# Patient Record
Sex: FEMALE | Race: OTHER
[De-identification: names, ages, dates, MRNs, and addresses within clinical notes are randomized per-mention and may not be internally consistent; named-entity substitution may affect disease eponyms.]

---

## 2020-10-05 ENCOUNTER — TRANSCRIPTION ENCOUNTER (OUTPATIENT)
Age: 30
End: 2020-10-05

## 2020-10-14 ENCOUNTER — TRANSCRIPTION ENCOUNTER (OUTPATIENT)
Age: 30
End: 2020-10-14

## 2020-10-15 ENCOUNTER — EMERGENCY (EMERGENCY)
Facility: HOSPITAL | Age: 30
LOS: 0 days | Discharge: HOME | End: 2020-10-15
Attending: EMERGENCY MEDICINE | Admitting: EMERGENCY MEDICINE
Payer: COMMERCIAL

## 2020-10-15 VITALS
OXYGEN SATURATION: 100 % | TEMPERATURE: 98 F | HEIGHT: 63 IN | SYSTOLIC BLOOD PRESSURE: 114 MMHG | DIASTOLIC BLOOD PRESSURE: 66 MMHG | WEIGHT: 130.07 LBS | HEART RATE: 96 BPM | RESPIRATION RATE: 18 BRPM

## 2020-10-15 DIAGNOSIS — I80.9 PHLEBITIS AND THROMBOPHLEBITIS OF UNSPECIFIED SITE: ICD-10-CM

## 2020-10-15 DIAGNOSIS — M25.539 PAIN IN UNSPECIFIED WRIST: ICD-10-CM

## 2020-10-15 PROCEDURE — 99282 EMERGENCY DEPT VISIT SF MDM: CPT

## 2020-10-15 NOTE — ED PROVIDER NOTE - OBJECTIVE STATEMENT
30 year old female with pmhs of miscarriage on Oct 5, with IV placement to right hand, present with pain and swelling to right hand over last 2 weeks. pt went to  had normal xray and was sent for evaluation. no trauma, redness, fever or chills.

## 2020-10-15 NOTE — ED PROVIDER NOTE - PHYSICAL EXAMINATION
GEN: Patient in no acute distress  MS: Normal ROM. pulses 2 +.  SKIN: tenderness along dorsum of right hand along vein, ripe like appearance, no redness or warmth. Warm, dry, no acute rashes. Good turgor  NEURO: Strength 5/5 with no sensory deficits to right hand

## 2020-10-15 NOTE — ED PROVIDER NOTE - NS ED ROS FT
Review of Systems:  	•	CONSTITUTIONAL - no fever, no diaphoresis, no chills  	•	SKIN - no rash  	•	HEMATOLOGIC - no bleeding, no bruising  	•	MUSCULOSKELETAL - + right hand pain and swelling  	•	NEUROLOGIC - no weakness, no paresthesias

## 2020-10-15 NOTE — ED PROVIDER NOTE - CARE PROVIDER_API CALL
Nestor Varma  Vascular Surgery  [address] [place] [address]  Phone: [phone]  Fax: [phone]  Follow Up Time:

## 2020-10-15 NOTE — ED PROVIDER NOTE - PATIENT PORTAL LINK FT
You can access the FollowMyHealth Patient Portal offered by [facility] by registering at the following website: [email or web address]. By joining [facility]’s FollowMyHealth portal, you will also be able to view your health information using other applications (apps) compatible with our system.

## 2020-10-15 NOTE — ED PROVIDER NOTE - NSFOLLOWUPINSTRUCTIONS_ED_ALL_ED_FT
Superficial Thrombophlebitis    WHAT YOU NEED TO KNOW:    Superficial thrombophlebitis (STP) is inflammation of a vein just under your skin (superficial vein). The inflammation causes a blood clot to form in your vein. STP most often happens in your leg but may also happen in your arm.     DISCHARGE INSTRUCTIONS:    Call your local emergency number (911 in the [place]) if:   •You feel lightheaded, short of breath, and have chest pain.      •You cough up blood.      Call your doctor or hematologist if:   •Your arm or leg feels warm, tender, and painful. It may look swollen and red.      •You have questions or concerns about your condition or care.      Medicines: You may need any of the following:   •Antibiotics may be given to treat a bacterial infection.       •NSAIDs, such as ibuprofen, help decrease swelling, pain, and fever. NSAIDs can cause stomach bleeding or kidney problems in certain people. If you take blood thinner medicine, always ask your healthcare provider if NSAIDs are safe for you. Always read the medicine label and follow directions.      •Blood thinners help prevent blood clots. Clots can cause strokes, heart attacks, and death. The following are general safety guidelines to follow while you are taking a blood thinner:?Watch for bleeding and bruising while you take blood thinners. Watch for bleeding from your gums or nose. Watch for blood in your urine and bowel movements. Use a soft washcloth on your skin, and a soft toothbrush to brush your teeth. This can keep your skin and gums from bleeding. If you shave, use an electric shaver. Do not play contact sports.       ?Tell your dentist and other healthcare providers that you take a blood thinner. Wear a bracelet or necklace that says you take this medicine.       ?Do not start or stop any other medicines unless your healthcare provider tells you to. Many medicines cannot be used with blood thinners.      ?Take your blood thinner exactly as prescribed by your healthcare provider. Do not skip does or take less than prescribed. Tell your provider right away if you forget to take your blood thinner, or if you take too much.      ?Warfarin is a blood thinner that you may need to take. The following are things you should be aware of if you take warfarin: ?Foods and medicines can affect the amount of warfarin in your blood. Do not make major changes to your diet while you take warfarin. Warfarin works best when you eat about the same amount of vitamin K every day. Vitamin K is found in green leafy vegetables and certain other foods. Ask for more information about what to eat when you are taking warfarin.      ?You will need to see your healthcare provider for follow-up visits when you are on warfarin. You will need regular blood tests. These tests are used to decide how much medicine you need.         •Antiplatelets, such as aspirin, help prevent blood clots. Take your antiplatelet medicine exactly as directed. These medicines make it more likely for you to bleed or bruise. If you are told to take aspirin, do not take acetaminophen or ibuprofen instead.       •Take your medicine as directed. Contact your healthcare provider if you think your medicine is not helping or if you have side effects. Tell him of her if you are allergic to any medicine. Keep a list of the medicines, vitamins, and herbs you take. Include the amounts, and when and why you take them. Bring the list or the pill bottles to follow-up visits. Carry your medicine list with you in case of an emergency.      Manage STP:   •Apply a warm compress to your arm or leg. This will help decrease swelling and pain. Wet a washcloth in warm water. Do not use hot water. Apply the warm compress for 10 minutes. Repeat this 4 times each day.       •Wear pressure stockings as directed. Pressure stockings improve blood flow and help prevent clots in your legs. Wear the stockings during the day. Do not wear them when you sleep.   Pressure Stockings            •Elevate your leg or arm above the level of your heart as often as you can. This will help decrease swelling and pain. Prop your leg or arm on pillows or blankets to keep it elevated comfortably.   Elevate Leg           Prevent STP:   •Maintain a healthy weight. This will help decrease your risk for another blood clot. Ask your healthcare provider how much you should weigh. Ask him or her to help you create a weight loss plan if you are overweight.      •Do not smoke. Nicotine and other chemicals in cigarettes and cigars can damage blood vessels and increase your risk for blood clots. Ask your healthcare provider for information if you currently smoke and need help to quit. E-cigarettes or smokeless tobacco still contain nicotine. Talk to your healthcare provider before you use these products.      •Change your body position or move around often. Move and stretch in your seat several times each hour if you travel by car or work at a desk. In an airplane, get up and walk every hour. Move your legs by tightening and releasing your leg muscles while sitting. You can move your legs while sitting by raising and lowering your heels. Keep your toes on the floor while you do this. You can also raise and lower your toes while keeping your heels on the floor.  DVT Prevention Heel Raise       DVT Prevention Toe Raise           •Exercise regularly to help increase your blood flow. Walking is a good low-impact exercise. Talk to your healthcare provider about the best exercise plan for you.   Walking for Exercise           •Do not inject illegal drugs. Talk to your healthcare provider if you use IV drugs and need help to quit.      Follow up with your doctor or hematologist as directed: You may need to come in regularly for scans to check for blood clots. Your blood may checked to see how long it takes to clot. Your doctor or specialist will tell you if you need to have this test and how often to have it. Write down your questions so you remember to ask them during your visits.

## 2020-10-15 NOTE — ED ADULT TRIAGE NOTE - CHIEF COMPLAINT QUOTE
Pt seen in urgent care yesterday for right wrist pain, x-rays negative at urgent care. Pt instructed to come to ED for further evaluation. Pt denies injury, denies fall to wrist.

## 2020-10-15 NOTE — ED PROVIDER NOTE - ATTENDING CONTRIBUTION TO CARE
pt sp IV placement oct 5th in hand. co pain at site. went to urgent care, had neg xray, was sent for further eval. pt taking apap.   pt in nad, hand nml appearing. tender over dorsum of hand. no swelling, crepitus, erythema, wrmth. joints nml, all FROM. nml cap refill. m/u/r nerves nml. pulses nml. no FB palpated. no induration or fluctuance.      likely superfic phlebitis. nsaids, outtp f/u .

## 2020-10-16 PROBLEM — Z00.00 ENCOUNTER FOR PREVENTIVE HEALTH EXAMINATION: Status: ACTIVE | Noted: 2020-10-16

## 2020-11-03 ENCOUNTER — APPOINTMENT (OUTPATIENT)
Age: 30
End: 2020-11-03
Payer: COMMERCIAL

## 2020-11-03 VITALS — BODY MASS INDEX: 23.04 KG/M2 | WEIGHT: 130 LBS | HEIGHT: 63 IN | TEMPERATURE: 98 F

## 2020-11-03 VITALS — SYSTOLIC BLOOD PRESSURE: 125 MMHG | DIASTOLIC BLOOD PRESSURE: 83 MMHG

## 2020-11-03 PROCEDURE — 99213 OFFICE O/P EST LOW 20 MIN: CPT

## 2020-11-03 PROCEDURE — 99072 ADDL SUPL MATRL&STAF TM PHE: CPT

## 2020-11-03 PROCEDURE — 93971 EXTREMITY STUDY: CPT

## 2020-11-03 NOTE — HISTORY OF PRESENT ILLNESS
[FreeTextEntry1] : The patient is a 30-year-old female who recently had a miscarriage was hospitalized. Should intravenous placed in her right hand. The patient states after she was discharged she began to develop right hand and wrist pain and swelling. [facility] diagnosed with phlebitis however never had a venous duplex exam\par \par \par Due to COVID-19, pre-visit patient instructions were explained to the patient and their symptoms were checked upon arrival. Masks were used by the healthcare provider and staff and the examination room was cleaned after the patient visit was concluded.\par

## 2020-11-03 NOTE — ASSESSMENT
[FreeTextEntry1] : The patient is a 30-year-old female status post a right hand intravenous she developed a right hand phlebitis. I performed a venous duplex that showed superficial thrombophlebitis in the basilic vein at the wrist and anterior side of the hand. All of the major vein splenic hospital. I recommend she continue with warm compresses and nonsteroidal anti-inflammatories as tolerated. No vascular surgery intervention at this time

## 2020-11-06 ENCOUNTER — APPOINTMENT (OUTPATIENT)
Dept: VASCULAR SURGERY | Facility: CLINIC | Age: 30
End: 2020-11-06
Payer: COMMERCIAL

## 2020-11-06 DIAGNOSIS — M79.602 PAIN IN LEFT ARM: ICD-10-CM

## 2020-11-06 DIAGNOSIS — I80.8 PHLEBITIS AND THROMBOPHLEBITIS OF OTHER SITES: ICD-10-CM

## 2020-11-06 PROCEDURE — 93971 EXTREMITY STUDY: CPT

## 2020-11-06 PROCEDURE — 99072 ADDL SUPL MATRL&STAF TM PHE: CPT

## 2020-11-06 PROCEDURE — 99213 OFFICE O/P EST LOW 20 MIN: CPT

## 2020-11-06 RX ORDER — MULTIVIT-MIN/IRON/FOLIC ACID/K 18-600-40
CAPSULE ORAL
Refills: 0 | Status: ACTIVE | COMMUNITY

## 2020-11-06 RX ORDER — IRON/IRON ASP GLY/FA/MV-MIN 38 125-25-1MG
TABLET ORAL
Refills: 0 | Status: ACTIVE | COMMUNITY

## 2020-11-06 NOTE — ASSESSMENT
[FreeTextEntry1] : 29 y/o female who was hospitalized for miscarriage recently, seen for phlebitis in the right upper extremity few days ago, presents with left arm pain since yesterday, had blood drawn from left UE during hospitalization.\par \par On venous duplex today there was finding of acute thrombus in the left ulnar vein. She was instructed to take Aleve for 5 days and warm compresses to the area. She is being sent for thrombophilia workup by her GYN.

## 2020-11-06 NOTE — DATA REVIEWED
[FreeTextEntry1] : I performed a venous duplex which was medically necessary to evaluate for DVT. It shwoed acute thrombus in the left ulnar vein.\par

## 2020-11-06 NOTE — HISTORY OF PRESENT ILLNESS
[FreeTextEntry1] : 31 y/o female who was hospitalized for miscarriage recently, seen for phlebitis in the right upper extremity few days ago, presents with left arm pain since yesterday, had blood drawn from left UE during hospitalization.

## 2021-06-14 ENCOUNTER — EMERGENCY (EMERGENCY)
Facility: HOSPITAL | Age: 31
LOS: 0 days | Discharge: HOME | End: 2021-06-15
Attending: EMERGENCY MEDICINE | Admitting: EMERGENCY MEDICINE
Payer: COMMERCIAL

## 2021-06-14 VITALS
RESPIRATION RATE: 18 BRPM | OXYGEN SATURATION: 100 % | HEIGHT: 63 IN | TEMPERATURE: 100 F | WEIGHT: 141.1 LBS | SYSTOLIC BLOOD PRESSURE: 111 MMHG | HEART RATE: 90 BPM | DIASTOLIC BLOOD PRESSURE: 87 MMHG

## 2021-06-14 DIAGNOSIS — O99.891 OTHER SPECIFIED DISEASES AND CONDITIONS COMPLICATING PREGNANCY: ICD-10-CM

## 2021-06-14 DIAGNOSIS — R10.84 GENERALIZED ABDOMINAL PAIN: ICD-10-CM

## 2021-06-14 DIAGNOSIS — Z3A.01 LESS THAN 8 WEEKS GESTATION OF PREGNANCY: ICD-10-CM

## 2021-06-14 DIAGNOSIS — Z98.890 OTHER SPECIFIED POSTPROCEDURAL STATES: Chronic | ICD-10-CM

## 2021-06-14 DIAGNOSIS — O21.8 OTHER VOMITING COMPLICATING PREGNANCY: ICD-10-CM

## 2021-06-14 DIAGNOSIS — O20.0 THREATENED ABORTION: ICD-10-CM

## 2021-06-14 PROCEDURE — 99284 EMERGENCY DEPT VISIT MOD MDM: CPT

## 2021-06-14 RX ORDER — ACETAMINOPHEN 500 MG
975 TABLET ORAL ONCE
Refills: 0 | Status: COMPLETED | OUTPATIENT
Start: 2021-06-14 | End: 2021-06-14

## 2021-06-14 RX ORDER — ONDANSETRON 8 MG/1
4 TABLET, FILM COATED ORAL ONCE
Refills: 0 | Status: COMPLETED | OUTPATIENT
Start: 2021-06-14 | End: 2021-06-14

## 2021-06-14 NOTE — ED PROVIDER NOTE - PROGRESS NOTE DETAILS
TC: 30 yo F TC: 32 yo F with no PMHx,  ~7 wks pregnant who presents with vaginal spotting and abd pain x2 hrs. Vitals wnl in ED. No peritonitic signs in ED. Pelvic exam shows some blood in vaginal vault with open os. Ordered labs, urine, US. Given tylenol, zofran. Will reassess. TC: Reassessed pt, reports abd pain improved and vaginal spotting stopped however still feels bloated. Beta quant 7000s. Pt taken to US. TC: Reassessed pt, reports abd pain improved and vaginal spotting stopped however still feels bloated. Beta quant ~8000. Pt taken to US. TC: Ua negative. US shows IUP 6w1d but no fetal HR detected which may be due to early gestation. Advised to f/u with OBGYN in 2 days for repeat beta quant and US. Strict ED return precautions given. Pt verbalized understanding and was agreeable with plan.

## 2021-06-14 NOTE — ED PROVIDER NOTE - ATTENDING CONTRIBUTION TO CARE
I personally evaluated the patient. I reviewed the Resident’s or Physician Assistant’s note (as assigned above), and agree with the findings and plan except as documented in my note.    30 yo F with no PMHx,  ~7 wks pregnant who presents with  abd cramping associated with nausea, vomiting, vaginal spotting, abd bloating which occurred 2 hrs prior to arrival. No fever, chills, cp, sob, dysuria, vaginal discharge, hematochezia, melena. Pt had IUP confirmed on US last wk.     CONSTITUTIONAL: Well-developed; well-nourished; in no acute distress. Sitting up and providing appropriate history and physical examination  SKIN: skin exam is warm and dry, no acute rash.  HEAD: Normocephalic; atraumatic.  EYES: PERRL, 3 mm bilateral, no nystagmus, EOM intact; conjunctiva and sclera clear.  ENT: No nasal discharge; airway clear.  NECK: Supple; non tender.+ full passive ROM in all directions. No JVD  CARD: S1, S2 normal; no murmurs, gallops, or rubs. Regular rate and rhythm. + Symmetric Strong Pulses  RESP: No wheezes, rales or rhonchi. Good air movement bilaterally  ABD: soft; non-distended; non-tender. No Rebound, No Gaurding, No signs of peritnitis, No CVA tenderness      Plan- US, labs, reassess

## 2021-06-14 NOTE — ED PROVIDER NOTE - CARE PROVIDER_API CALL
Joseph Mendoza  OBSTETRICS AND GYNECOLOGY  [address] [place] [address]  Phone: [phone]  Fax: [phone]  Follow Up Time: 1-3 Days

## 2021-06-14 NOTE — ED ADULT TRIAGE NOTE - CHIEF COMPLAINT QUOTE
Pt reports she is about 7 weeks pregnant. She states she went to have a bowel movement and starting spotting. Pt reports lower abominal pain and cramping. Has hx of 1 miscarriage in October 2020.

## 2021-06-14 NOTE — ED PROVIDER NOTE - PHYSICAL EXAMINATION
CONSTITUTIONAL: well developed, nontoxic appearing, in no acute distress, speaking in full sentences  SKIN: warm, dry, no rash, cap refill < 2 seconds  HEENT: normocephalic, atraumatic, no conjunctival erythema, moist mucous membranes, patent airway  NECK: supple  CV:  regular rate, regular rhythm, 2+ radial pulses bilaterally  RESP: no wheezes, no rales, no rhonchi, normal work of breathing  ABD: soft, LLQ tenderness, no RLQ tenderness, nondistended, no rebound, no guarding  BACK: no CVA tenderness  : normal external genitalia without lesions or rash, small amount of brown blood in vaginal vault, os open, no CMT, no fundal/adnexal tenderness, no palpable mass, female chaperone DAYA Jacobson present  MSK: normal ROM, no cyanosis, no edema  NEURO: alert, oriented, grossly unremarkable  PSYCH: cooperative, appropriate

## 2021-06-14 NOTE — ED PROVIDER NOTE - PATIENT PORTAL LINK FT
You can access the FollowMyHealth Patient Portal offered by [facility] by registering at the following website: [email or web address]. By joining [facility]’s FollowMyHealth portal, you will also be able to view your health information using other applications (apps) compatible with our system.

## 2021-06-14 NOTE — ED PROVIDER NOTE - NS ED ROS FT
GEN:  no fever, no chills, no generalized weakness  NEURO:  no headache, no dizziness  ENT: no runny nose  CV:  no chest pain  RESP:  no sob, no cough  GI:  + nausea, + vomiting, + abdominal pain, no diarrhea  :  no dysuria  MSK:  no edema  SKIN:  no rash  HEME: no hematochezia, no melena

## 2021-06-14 NOTE — ED PROVIDER NOTE - OBJECTIVE STATEMENT
32 yo F with no PMHx,  ~7 wks pregnant who presents with acute onset of diffuse, mod/severe abd cramping associated with nausea, vomiting, vaginal spotting, abd bloating which occurred 2 hrs prior to arrival after having a BM. No alleviating/aggravating factors. No fever, chills, cp, sob, dysuria, vaginal discharge, hematochezia, melena. Pt had IUP confirmed on US last wk. Hx of D&C x1 and miscarriage in 10/2020.    OBGYN: Dr. Wilson

## 2021-06-14 NOTE — ED PROVIDER NOTE - NSFOLLOWUPINSTRUCTIONS_ED_ALL_ED_FT
Heartburn During Pregnancy    Heartburn is a type of pain or discomfort that can happen in the throat or chest. It is often described as a burning sensation. Heartburn is common during pregnancy because:  •Progesterone, a hormone that is released during pregnancy, may relax the valve that separates the esophagus from the stomach (lower esophageal sphincter, or LES). This allows stomach acid to move up into the esophagus, causing heartburn.    •The uterus gets larger and pushes up on the stomach, which pushes more acid into the esophagus. This is especially true in the later stages of pregnancy.    Heartburn usually goes away or gets better after giving birth.    What are the causes?  This condition is caused by stomach acid backing up into the esophagus (reflux). Reflux can be triggered by:  •Changing hormone levels.  •Large meals.  •Certain foods and beverages, such as coffee, chocolate, onions, and peppermint.  •Exercise.  •Increased stomach acid production.    What increases the risk?  You are more likely to develop this condition if:  •You had heartburn prior to becoming pregnant.  •You have been pregnant more than once before.  •You are overweight or obese.    The likelihood that you will get heartburn also increases as you get further along in your pregnancy, especially during the last trimester.    What are the signs or symptoms?  Symptoms of this condition include:  •Burning pain in the chest or lower throat.  •A bitter taste in the mouth.  •Coughing.  •Problems swallowing.  •Vomiting.  •A hoarse voice.  •Asthma.    Symptoms may get worse when you lie down or bend over. Symptoms are often worse at night.    How is this diagnosed?  This condition is diagnosed based on:  •Your medical history.  •Your symptoms.  •Blood tests to check for a certain type of bacteria that is associated with heartburn.  •Whether taking heartburn medicine relieves your symptoms.  •An examination of the stomach and esophagus using a tube that has a light and camera (endoscopy).    How is this treated?  Treatment for this condition depends on how severe your symptoms are. Your health care provider may recommend:  •Over-the-counter medicines for mild heartburn, such as antacids or acid reducers.  •Prescription medicines to decrease stomach acid or to protect your stomach lining.  •Certain changes in your diet.  •Raising the head of your bed so it is higher than the foot of the bed. This helps prevent stomach acid from backing up into the esophagus when you are lying down.    Follow these instructions at home:    Eating and drinking     • Do not drink alcohol during your pregnancy.  •Identify foods and beverages that make your symptoms worse and avoid them.  •Eat small, frequent meals instead of large meals.  •Avoid drinking large amounts of liquid with your meals.  •Avoid eating meals during the 2–3 hours before bedtime.  •Avoid lying down right after you eat.  • Do not exercise right after you eat.    Beverages to avoid     •Coffee and tea, with or without caffeine.  •Energy drinks and sports drinks.  •Carbonated drinks or sodas.  •Citrus fruit juices.    Foods to avoid     •Chocolate and cocoa.  •Peppermint and mint flavorings.  •Garlic, onions, and horseradish.  •Spicy and acidic foods, including peppers, chili powder, oakes powder, vinegar, hot sauces, and barbecue sauce.  •Citrus fruits, such as oranges, tc, and limes.  •Tomato-based foods, such as red sauce, chili, and salsa.  •Fried and fatty foods, such as donuts, french fries, potato chips, and high-fat dressings.  •High-fat meats, such as hot dogs, precooked or cured meat, sausage, ham, and montero.  •High-fat dairy items, such as whole milk, butter, and cheese.    Medicines     •Take over-the-counter and prescription medicines only as told by your health care provider.  • Do not take aspirin or NSAIDs, such as ibuprofen, unless your health care provider tells you to take them.  •You may be instructed to avoid medicines that contain sodium bicarbonate.    General instructions     •If directed, raise the head of your bed about 6 inches (15 cm) by putting blocks under the legs. Sleeping with more pillows does not effectively relieve heartburn because it only changes the position of your head.  • Do not use any products that contain nicotine or tobacco, such as cigarettes, e-cigarettes, and chewing tobacco. If you need help quitting, ask your health care provider.  •Wear loose-fitting clothing.  •Try to reduce your stress, such as with yoga or meditation. If you need help managing stress, ask your health care provider.  •Maintain a healthy weight. If you are overweight, work with your health care provider to safely manage your weight.  •Keep all follow-up visits as told by your health care provider. This is important.    Where to find more information    •American Pregnancy Association: americanpregnancy.org    Contact a health care provider if:    •Your symptoms do not improve with treatment, or you develop new symptoms.  •You have unexplained weight loss.  •You have difficulty swallowing.  •You make loud sounds when you breathe (wheeze).  •You have a cough that does not go away.  •You have frequent heartburn for more than 2 weeks.  •You have nausea or vomiting that does not get better with treatment.  •You have pain in your abdomen.    Get help right away if:    •You have severe chest pain that spreads to your arm, neck, or jaw.  •You feel sweaty, dizzy, or light-headed.  •You have shortness of breath.  •You have pain when swallowing.  •You vomit, and your vomit looks like blood or coffee grounds.  •Your stool is bloody or black.    Summary    •Heartburn in pregnancy is common, especially during the last trimester.  •This condition is caused by stomach acid backing up into the esophagus (reflux).  •This condition can be treated with medicines, changes to your diet, or elevating the head of your bed.  •Keep all follow-up visits as told by your health care provider. This is important.    This information is not intended to replace advice given to you by your health care provider. Make sure you discuss any questions you have with your health care provider. Threatened Miscarriage    A threatened miscarriage is the term for vaginal bleeding during your first 20 weeks of pregnancy but the pregnancy has not ended. If you have vaginal bleeding during this time, your health care provider will do tests to check if you are still pregnant. If the tests show you are still pregnant and the developing baby (fetus) inside your womb (uterus) is still growing, your condition is considered a threatened miscarriage. A threatened miscarriage does not mean your pregnancy will end, but it does increase the risk of losing your pregnancy. It is important to have close follow up with your obstetrician.    SEEK IMMEDIATE MEDICAL CARE IF YOU HAVE ANY OF THE FOLLOWING SYMPTOMS: heavy vaginal bleeding, severe low back or abdominal cramps, fever/chills, or lightheadedness/dizziness.    Heartburn During Pregnancy    Heartburn is a type of pain or discomfort that can happen in the throat or chest. It is often described as a burning sensation. Heartburn is common during pregnancy because:  •Progesterone, a hormone that is released during pregnancy, may relax the valve that separates the esophagus from the stomach (lower esophageal sphincter, or LES). This allows stomach acid to move up into the esophagus, causing heartburn.    •The uterus gets larger and pushes up on the stomach, which pushes more acid into the esophagus. This is especially true in the later stages of pregnancy.    Heartburn usually goes away or gets better after giving birth.    What are the causes?  This condition is caused by stomach acid backing up into the esophagus (reflux). Reflux can be triggered by:  •Changing hormone levels.  •Large meals.  •Certain foods and beverages, such as coffee, chocolate, onions, and peppermint.  •Exercise.  •Increased stomach acid production.    What increases the risk?  You are more likely to develop this condition if:  •You had heartburn prior to becoming pregnant.  •You have been pregnant more than once before.  •You are overweight or obese.    The likelihood that you will get heartburn also increases as you get further along in your pregnancy, especially during the last trimester.    What are the signs or symptoms?  Symptoms of this condition include:  •Burning pain in the chest or lower throat.  •A bitter taste in the mouth.  •Coughing.  •Problems swallowing.  •Vomiting.  •A hoarse voice.  •Asthma.    Symptoms may get worse when you lie down or bend over. Symptoms are often worse at night.    How is this diagnosed?  This condition is diagnosed based on:  •Your medical history.  •Your symptoms.  •Blood tests to check for a certain type of bacteria that is associated with heartburn.  •Whether taking heartburn medicine relieves your symptoms.  •An examination of the stomach and esophagus using a tube that has a light and camera (endoscopy).    How is this treated?  Treatment for this condition depends on how severe your symptoms are. Your health care provider may recommend:  •Over-the-counter medicines for mild heartburn, such as antacids or acid reducers.  •Prescription medicines to decrease stomach acid or to protect your stomach lining.  •Certain changes in your diet.  •Raising the head of your bed so it is higher than the foot of the bed. This helps prevent stomach acid from backing up into the esophagus when you are lying down.    Follow these instructions at home:    Eating and drinking     • Do not drink alcohol during your pregnancy.  •Identify foods and beverages that make your symptoms worse and avoid them.  •Eat small, frequent meals instead of large meals.  •Avoid drinking large amounts of liquid with your meals.  •Avoid eating meals during the 2–3 hours before bedtime.  •Avoid lying down right after you eat.  • Do not exercise right after you eat.    Beverages to avoid     •Coffee and tea, with or without caffeine.  •Energy drinks and sports drinks.  •Carbonated drinks or sodas.  •Citrus fruit juices.    Foods to avoid     •Chocolate and cocoa.  •Peppermint and mint flavorings.  •Garlic, onions, and horseradish.  •Spicy and acidic foods, including peppers, chili powder, oakes powder, vinegar, hot sauces, and barbecue sauce.  •Citrus fruits, such as oranges, tc, and limes.  •Tomato-based foods, such as red sauce, chili, and salsa.  •Fried and fatty foods, such as donuts, french fries, potato chips, and high-fat dressings.  •High-fat meats, such as hot dogs, precooked or cured meat, sausage, ham, and montero.  •High-fat dairy items, such as whole milk, butter, and cheese.    Medicines     •Take over-the-counter and prescription medicines only as told by your health care provider.  • Do not take aspirin or NSAIDs, such as ibuprofen, unless your health care provider tells you to take them.  •You may be instructed to avoid medicines that contain sodium bicarbonate.    General instructions     •If directed, raise the head of your bed about 6 inches (15 cm) by putting blocks under the legs. Sleeping with more pillows does not effectively relieve heartburn because it only changes the position of your head.  • Do not use any products that contain nicotine or tobacco, such as cigarettes, e-cigarettes, and chewing tobacco. If you need help quitting, ask your health care provider.  •Wear loose-fitting clothing.  •Try to reduce your stress, such as with yoga or meditation. If you need help managing stress, ask your health care provider.  •Maintain a healthy weight. If you are overweight, work with your health care provider to safely manage your weight.  •Keep all follow-up visits as told by your health care provider. This is important.    Where to find more information    •American Pregnancy Association: americanpregnancy.org    Contact a health care provider if:    •Your symptoms do not improve with treatment, or you develop new symptoms.  •You have unexplained weight loss.  •You have difficulty swallowing.  •You make loud sounds when you breathe (wheeze).  •You have a cough that does not go away.  •You have frequent heartburn for more than 2 weeks.  •You have nausea or vomiting that does not get better with treatment.  •You have pain in your abdomen.    Get help right away if:    •You have severe chest pain that spreads to your arm, neck, or jaw.  •You feel sweaty, dizzy, or light-headed.  •You have shortness of breath.  •You have pain when swallowing.  •You vomit, and your vomit looks like blood or coffee grounds.  •Your stool is bloody or black.    Summary    •Heartburn in pregnancy is common, especially during the last trimester.  •This condition is caused by stomach acid backing up into the esophagus (reflux).  •This condition can be treated with medicines, changes to your diet, or elevating the head of your bed.  •Keep all follow-up visits as told by your health care provider. This is important.    This information is not intended to replace advice given to you by your health care provider. Make sure you discuss any questions you have with your health care provider.

## 2021-06-15 LAB
ALBUMIN SERPL ELPH-MCNC: 4.3 G/DL — SIGNIFICANT CHANGE UP (ref 3.5–5.2)
ALP SERPL-CCNC: 63 U/L — SIGNIFICANT CHANGE UP (ref 30–115)
ALT FLD-CCNC: 20 U/L — SIGNIFICANT CHANGE UP (ref 0–41)
ANION GAP SERPL CALC-SCNC: 14 MMOL/L — SIGNIFICANT CHANGE UP (ref 7–14)
APPEARANCE UR: CLEAR — SIGNIFICANT CHANGE UP
AST SERPL-CCNC: 27 U/L — SIGNIFICANT CHANGE UP (ref 0–41)
BASOPHILS # BLD AUTO: 0.02 K/UL — SIGNIFICANT CHANGE UP (ref 0–0.2)
BASOPHILS NFR BLD AUTO: 0.2 % — SIGNIFICANT CHANGE UP (ref 0–1)
BILIRUB SERPL-MCNC: 0.2 MG/DL — SIGNIFICANT CHANGE UP (ref 0.2–1.2)
BILIRUB UR-MCNC: NEGATIVE — SIGNIFICANT CHANGE UP
BLD GP AB SCN SERPL QL: SIGNIFICANT CHANGE UP
BUN SERPL-MCNC: 13 MG/DL — SIGNIFICANT CHANGE UP (ref 10–20)
CALCIUM SERPL-MCNC: 9.7 MG/DL — SIGNIFICANT CHANGE UP (ref 8.5–10.1)
CHLORIDE SERPL-SCNC: 103 MMOL/L — SIGNIFICANT CHANGE UP (ref 98–110)
CO2 SERPL-SCNC: 21 MMOL/L — SIGNIFICANT CHANGE UP (ref 17–32)
COLOR SPEC: SIGNIFICANT CHANGE UP
CREAT SERPL-MCNC: 0.6 MG/DL — LOW (ref 0.7–1.5)
DIFF PNL FLD: NEGATIVE — SIGNIFICANT CHANGE UP
EOSINOPHIL # BLD AUTO: 0.11 K/UL — SIGNIFICANT CHANGE UP (ref 0–0.7)
EOSINOPHIL NFR BLD AUTO: 1.3 % — SIGNIFICANT CHANGE UP (ref 0–8)
GLUCOSE SERPL-MCNC: 98 MG/DL — SIGNIFICANT CHANGE UP (ref 70–99)
GLUCOSE UR QL: NEGATIVE — SIGNIFICANT CHANGE UP
HCG SERPL-ACNC: 7989 MIU/ML — HIGH
HCT VFR BLD CALC: 38.3 % — SIGNIFICANT CHANGE UP (ref 37–47)
HGB BLD-MCNC: 13.1 G/DL — SIGNIFICANT CHANGE UP (ref 12–16)
IMM GRANULOCYTES NFR BLD AUTO: 0.1 % — SIGNIFICANT CHANGE UP (ref 0.1–0.3)
KETONES UR-MCNC: NEGATIVE — SIGNIFICANT CHANGE UP
LACTATE SERPL-SCNC: 1.3 MMOL/L — SIGNIFICANT CHANGE UP (ref 0.7–2)
LEUKOCYTE ESTERASE UR-ACNC: NEGATIVE — SIGNIFICANT CHANGE UP
LYMPHOCYTES # BLD AUTO: 2.73 K/UL — SIGNIFICANT CHANGE UP (ref 1.2–3.4)
LYMPHOCYTES # BLD AUTO: 31.8 % — SIGNIFICANT CHANGE UP (ref 20.5–51.1)
MCHC RBC-ENTMCNC: 29.9 PG — SIGNIFICANT CHANGE UP (ref 27–31)
MCHC RBC-ENTMCNC: 34.2 G/DL — SIGNIFICANT CHANGE UP (ref 32–37)
MCV RBC AUTO: 87.4 FL — SIGNIFICANT CHANGE UP (ref 81–99)
MONOCYTES # BLD AUTO: 0.66 K/UL — HIGH (ref 0.1–0.6)
MONOCYTES NFR BLD AUTO: 7.7 % — SIGNIFICANT CHANGE UP (ref 1.7–9.3)
NEUTROPHILS # BLD AUTO: 5.06 K/UL — SIGNIFICANT CHANGE UP (ref 1.4–6.5)
NEUTROPHILS NFR BLD AUTO: 58.9 % — SIGNIFICANT CHANGE UP (ref 42.2–75.2)
NITRITE UR-MCNC: NEGATIVE — SIGNIFICANT CHANGE UP
NRBC # BLD: 0 /100 WBCS — SIGNIFICANT CHANGE UP (ref 0–0)
PH UR: 6 — SIGNIFICANT CHANGE UP (ref 5–8)
PLATELET # BLD AUTO: 183 K/UL — SIGNIFICANT CHANGE UP (ref 130–400)
POTASSIUM SERPL-MCNC: 4.7 MMOL/L — SIGNIFICANT CHANGE UP (ref 3.5–5)
POTASSIUM SERPL-SCNC: 4.7 MMOL/L — SIGNIFICANT CHANGE UP (ref 3.5–5)
PROT SERPL-MCNC: 7 G/DL — SIGNIFICANT CHANGE UP (ref 6–8)
PROT UR-MCNC: NEGATIVE — SIGNIFICANT CHANGE UP
RBC # BLD: 4.38 M/UL — SIGNIFICANT CHANGE UP (ref 4.2–5.4)
RBC # FLD: 13.2 % — SIGNIFICANT CHANGE UP (ref 11.5–14.5)
SODIUM SERPL-SCNC: 138 MMOL/L — SIGNIFICANT CHANGE UP (ref 135–146)
SP GR SPEC: 1.01 — SIGNIFICANT CHANGE UP (ref 1.01–1.03)
UROBILINOGEN FLD QL: SIGNIFICANT CHANGE UP
WBC # BLD: 8.59 K/UL — SIGNIFICANT CHANGE UP (ref 4.8–10.8)
WBC # FLD AUTO: 8.59 K/UL — SIGNIFICANT CHANGE UP (ref 4.8–10.8)

## 2021-06-15 PROCEDURE — 76830 TRANSVAGINAL US NON-OB: CPT | Mod: 26

## 2021-06-15 RX ORDER — SODIUM CHLORIDE 9 MG/ML
1000 INJECTION, SOLUTION INTRAVENOUS ONCE
Refills: 0 | Status: COMPLETED | OUTPATIENT
Start: 2021-06-15 | End: 2021-06-15

## 2021-06-15 RX ADMIN — ONDANSETRON 4 MILLIGRAM(S): 8 TABLET, FILM COATED ORAL at 00:28

## 2021-06-15 RX ADMIN — Medication 975 MILLIGRAM(S): at 00:28

## 2021-06-15 RX ADMIN — SODIUM CHLORIDE 1000 MILLILITER(S): 9 INJECTION, SOLUTION INTRAVENOUS at 01:32

## 2021-06-15 RX ADMIN — SODIUM CHLORIDE 1000 MILLILITER(S): 9 INJECTION, SOLUTION INTRAVENOUS at 02:08

## 2021-06-15 NOTE — ED ADULT NURSE NOTE - NSIMPLEMENTINTERV_GEN_ALL_ED
Implemented All Universal Safety Interventions:  [place] to call system. Call bell, personal items and telephone within reach. Instruct patient to call for assistance. Room bathroom lighting operational. Non-slip footwear when patient is off stretcher. Physically safe environment: no spills, clutter or unnecessary equipment. Stretcher in lowest position, wheels locked, appropriate side rails in place.

## 2021-06-16 LAB
CULTURE RESULTS: SIGNIFICANT CHANGE UP
SPECIMEN SOURCE: SIGNIFICANT CHANGE UP

## 2021-09-04 ENCOUNTER — FORM ENCOUNTER (OUTPATIENT)
Age: 31
End: 2021-09-04

## 2021-09-05 ENCOUNTER — TRANSCRIPTION ENCOUNTER (OUTPATIENT)
Age: 31
End: 2021-09-05

## 2021-09-24 ENCOUNTER — EMERGENCY (EMERGENCY)
Facility: HOSPITAL | Age: 31
LOS: 0 days | Discharge: HOME | End: 2021-09-25
Attending: EMERGENCY MEDICINE | Admitting: EMERGENCY MEDICINE
Payer: COMMERCIAL

## 2021-09-24 ENCOUNTER — APPOINTMENT (OUTPATIENT)
Dept: ANTEPARTUM | Facility: CLINIC | Age: 31
End: 2021-09-24
Payer: COMMERCIAL

## 2021-09-24 ENCOUNTER — OUTPATIENT (OUTPATIENT)
Dept: OUTPATIENT SERVICES | Facility: HOSPITAL | Age: 31
LOS: 1 days | Discharge: HOME | End: 2021-09-24

## 2021-09-24 VITALS
WEIGHT: 136.91 LBS | OXYGEN SATURATION: 98 % | HEART RATE: 89 BPM | DIASTOLIC BLOOD PRESSURE: 72 MMHG | HEIGHT: 63 IN | TEMPERATURE: 98 F | RESPIRATION RATE: 17 BRPM | SYSTOLIC BLOOD PRESSURE: 136 MMHG

## 2021-09-24 VITALS
HEART RATE: 81 BPM | TEMPERATURE: 98.7 F | WEIGHT: 137 LBS | DIASTOLIC BLOOD PRESSURE: 55 MMHG | HEIGHT: 63 IN | SYSTOLIC BLOOD PRESSURE: 105 MMHG | OXYGEN SATURATION: 99 % | BODY MASS INDEX: 24.27 KG/M2

## 2021-09-24 DIAGNOSIS — O99.891 OTHER SPECIFIED DISEASES AND CONDITIONS COMPLICATING PREGNANCY: ICD-10-CM

## 2021-09-24 DIAGNOSIS — O99.611 DISEASES OF THE DIGESTIVE SYSTEM COMPLICATING PREGNANCY, FIRST TRIMESTER: ICD-10-CM

## 2021-09-24 DIAGNOSIS — Z98.890 OTHER SPECIFIED POSTPROCEDURAL STATES: Chronic | ICD-10-CM

## 2021-09-24 DIAGNOSIS — K52.9 NONINFECTIVE GASTROENTERITIS AND COLITIS, UNSPECIFIED: ICD-10-CM

## 2021-09-24 DIAGNOSIS — R19.7 DIARRHEA, UNSPECIFIED: ICD-10-CM

## 2021-09-24 DIAGNOSIS — Z3A.01 LESS THAN 8 WEEKS GESTATION OF PREGNANCY: ICD-10-CM

## 2021-09-24 DIAGNOSIS — R10.30 LOWER ABDOMINAL PAIN, UNSPECIFIED: ICD-10-CM

## 2021-09-24 LAB
ALBUMIN SERPL ELPH-MCNC: 4.3 G/DL — SIGNIFICANT CHANGE UP (ref 3.5–5.2)
ALP SERPL-CCNC: 61 U/L — SIGNIFICANT CHANGE UP (ref 30–115)
ALT FLD-CCNC: 27 U/L — SIGNIFICANT CHANGE UP (ref 0–41)
ANION GAP SERPL CALC-SCNC: 13 MMOL/L — SIGNIFICANT CHANGE UP (ref 7–14)
APPEARANCE UR: ABNORMAL
AST SERPL-CCNC: 29 U/L — SIGNIFICANT CHANGE UP (ref 0–41)
BASOPHILS # BLD AUTO: 0.02 K/UL — SIGNIFICANT CHANGE UP (ref 0–0.2)
BASOPHILS NFR BLD AUTO: 0.2 % — SIGNIFICANT CHANGE UP (ref 0–1)
BILIRUB SERPL-MCNC: <0.2 MG/DL — SIGNIFICANT CHANGE UP (ref 0.2–1.2)
BILIRUB UR-MCNC: ABNORMAL
BUN SERPL-MCNC: 15 MG/DL — SIGNIFICANT CHANGE UP (ref 10–20)
CALCIUM SERPL-MCNC: 9.5 MG/DL — SIGNIFICANT CHANGE UP (ref 8.5–10.1)
CHLORIDE SERPL-SCNC: 99 MMOL/L — SIGNIFICANT CHANGE UP (ref 98–110)
CO2 SERPL-SCNC: 20 MMOL/L — SIGNIFICANT CHANGE UP (ref 17–32)
COLOR SPEC: YELLOW — SIGNIFICANT CHANGE UP
CREAT SERPL-MCNC: 0.6 MG/DL — LOW (ref 0.7–1.5)
DIFF PNL FLD: NEGATIVE — SIGNIFICANT CHANGE UP
EOSINOPHIL # BLD AUTO: 0.15 K/UL — SIGNIFICANT CHANGE UP (ref 0–0.7)
EOSINOPHIL NFR BLD AUTO: 1.5 % — SIGNIFICANT CHANGE UP (ref 0–8)
GLUCOSE SERPL-MCNC: 100 MG/DL — HIGH (ref 70–99)
GLUCOSE UR QL: NEGATIVE — SIGNIFICANT CHANGE UP
HCT VFR BLD CALC: 36.9 % — LOW (ref 37–47)
HGB BLD-MCNC: 12.6 G/DL — SIGNIFICANT CHANGE UP (ref 12–16)
IMM GRANULOCYTES NFR BLD AUTO: 0.3 % — SIGNIFICANT CHANGE UP (ref 0.1–0.3)
KETONES UR-MCNC: NEGATIVE — SIGNIFICANT CHANGE UP
LEUKOCYTE ESTERASE UR-ACNC: NEGATIVE — SIGNIFICANT CHANGE UP
LYMPHOCYTES # BLD AUTO: 2.98 K/UL — SIGNIFICANT CHANGE UP (ref 1.2–3.4)
LYMPHOCYTES # BLD AUTO: 29.2 % — SIGNIFICANT CHANGE UP (ref 20.5–51.1)
MCHC RBC-ENTMCNC: 29 PG — SIGNIFICANT CHANGE UP (ref 27–31)
MCHC RBC-ENTMCNC: 34.1 G/DL — SIGNIFICANT CHANGE UP (ref 32–37)
MCV RBC AUTO: 84.8 FL — SIGNIFICANT CHANGE UP (ref 81–99)
MONOCYTES # BLD AUTO: 0.69 K/UL — HIGH (ref 0.1–0.6)
MONOCYTES NFR BLD AUTO: 6.8 % — SIGNIFICANT CHANGE UP (ref 1.7–9.3)
NEUTROPHILS # BLD AUTO: 6.32 K/UL — SIGNIFICANT CHANGE UP (ref 1.4–6.5)
NEUTROPHILS NFR BLD AUTO: 62 % — SIGNIFICANT CHANGE UP (ref 42.2–75.2)
NITRITE UR-MCNC: NEGATIVE — SIGNIFICANT CHANGE UP
NRBC # BLD: 0 /100 WBCS — SIGNIFICANT CHANGE UP (ref 0–0)
OB COMMENTS: NORMAL
PH UR: 5.5 — SIGNIFICANT CHANGE UP (ref 5–8)
PLATELET # BLD AUTO: 192 K/UL — SIGNIFICANT CHANGE UP (ref 130–400)
POTASSIUM SERPL-MCNC: 4.3 MMOL/L — SIGNIFICANT CHANGE UP (ref 3.5–5)
POTASSIUM SERPL-SCNC: 4.3 MMOL/L — SIGNIFICANT CHANGE UP (ref 3.5–5)
PROT SERPL-MCNC: 7 G/DL — SIGNIFICANT CHANGE UP (ref 6–8)
PROT UR-MCNC: SIGNIFICANT CHANGE UP
RBC # BLD: 4.35 M/UL — SIGNIFICANT CHANGE UP (ref 4.2–5.4)
RBC # FLD: 12.8 % — SIGNIFICANT CHANGE UP (ref 11.5–14.5)
SODIUM SERPL-SCNC: 132 MMOL/L — LOW (ref 135–146)
SP GR SPEC: 1.03 — SIGNIFICANT CHANGE UP (ref 1.01–1.03)
UROBILINOGEN FLD QL: ABNORMAL
WBC # BLD: 10.19 K/UL — SIGNIFICANT CHANGE UP (ref 4.8–10.8)
WBC # FLD AUTO: 10.19 K/UL — SIGNIFICANT CHANGE UP (ref 4.8–10.8)
WEEKS GESTATION: NORMAL

## 2021-09-24 PROCEDURE — 76815 OB US LIMITED FETUS(S): CPT | Mod: 26

## 2021-09-24 PROCEDURE — 99285 EMERGENCY DEPT VISIT HI MDM: CPT

## 2021-09-24 PROCEDURE — 99214 OFFICE O/P EST MOD 30 MIN: CPT | Mod: 25

## 2021-09-24 RX ORDER — TERCONAZOLE 4 MG/G
0.4 CREAM VAGINAL
Qty: 45 | Refills: 0 | Status: COMPLETED | COMMUNITY
Start: 2021-05-12

## 2021-09-24 RX ORDER — FLUCONAZOLE 150 MG/1
150 TABLET ORAL
Qty: 1 | Refills: 0 | Status: COMPLETED | COMMUNITY
Start: 2021-05-12

## 2021-09-24 RX ORDER — ENOXAPARIN SODIUM 100 MG/ML
40 INJECTION SUBCUTANEOUS DAILY
Qty: 30 | Refills: 6 | Status: ACTIVE | COMMUNITY
Start: 2021-09-24

## 2021-09-24 RX ORDER — FOLIC ACID 1 MG/1
1 TABLET ORAL
Qty: 30 | Refills: 0 | Status: ACTIVE | COMMUNITY
Start: 2021-09-12

## 2021-09-24 NOTE — HISTORY OF PRESENT ILLNESS
[FreeTextEntry1] : 32yo  @6w4d with DIANNA 22 by first trimester sonogram presenting today for initial consult for antiphospholipid syndrome.\par \par \par Patient has a history of superficial thrombophlebitis in , has been on low dose [id number] since then.  Currently taking daily ASA 81mg.  Daily prophylactic lovenox prescribed by primary MD, patient to start today.\par \par Historical values:\par  FVL negative\par prothrombin gene netive\par beta 2 glycoprotein negative\par anticardiolipin negative\par antiphosphatidylethanolamine IgM 19 (elevated), IgG 6 (N)

## 2021-09-24 NOTE — ED ADULT TRIAGE NOTE - CHIEF COMPLAINT QUOTE
Patient presents to ED 6 weeks pregnant with lower abdominal cramping that started 1 hour ago. Patient denies vaginal bleeding but reports 2 previous miscarriages last in June.

## 2021-09-24 NOTE — DISCUSSION/SUMMARY
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  Co-managed with Dr. Wilson\par \par #h/o thrombophlebitis\par - antiphospholipid workup negative apart from antiphosphatidylethanolamine IgM mildly elevated on \par - currently on ASA 81mg and will start lovenox 40mg daily per primary OB\par - Discussed that pregnancy in itself is a hypercoagulable state, and due to history of recurrent pregnancy loss and thrombophlebitis would recommend regular ambulation, continuing ASA daily and no traveling >4hrs consecutively. \par - if patient continues lovenox, would recommend transitioning to prophylactic heparin at 36wks.\par - has a follow up appointment with hematologist on 10/5\par \par #previable  delivery\par - patient had previable PPROM and back pain at 20 weeks and was induced and delivered vaginally\par - Serial cervical lengths are also recommended in this population to start between 14 and 16 weeks gestation. This should occur approximately every 2 weeks assuming that the cervical length remains greater than 3 cm and should continue until approximately 24 weeks. Should the cervical length shorten to 2.5 - 3 cm then surveillance should increase to a weekly basis. Should it shorten to < 2.5 cm then her options should be discussed. \par - s/p normal carrier testing, no cytogenetics done\par \par #pregnancy\par - prenatal vitamin\par - PO hydration and ambulation encouraged\par - bleeding precautions discussed\par \par Return to high risk clinic prn.  Schedule for nuchal in 5-6 wks and serial cervical lengths starting at 16 weeks up to 24 weeks.\par \par Dr. Mathew aware.

## 2021-09-24 NOTE — HISTORY OF PRESENT ILLNESS
[FreeTextEntry1] : 32yo  @6w4d with DIANNA 22 by first trimester sonogram presenting today for initial consult for antiphospholipid syndrome.\par \par \par Patient has a history of superficial thrombophlebitis in , has been on low dose [id number] since then.  Currently taking daily ASA 81mg.  Daily prophylactic lovenox prescribed by primary MD, patient to start today.\par \par Historical values:\par  FVL negative\par prothrombin gene netive\par beta 2 glycoprotein negative\par anticardiolipin negative\par antiphosphatidylethanolamine IgM 19 (elevated), IgG 6 (N)

## 2021-09-24 NOTE — DISCUSSION/SUMMARY
[FreeTextEntry1] : 32yo  @6w4d, Rh positive, for initial MFM consult for suspected hemoglobinopathy.  Co-managed with Dr. Wilson\par \par #h/o thrombophlebitis\par - antiphospholipid workup negative apart from antiphosphatidylethanolamine IgM mildly elevated on \par - currently on ASA 81mg and will start lovenox 40mg daily per primary OB\par - Discussed that pregnancy in itself is a hypercoagulable state, and due to history of recurrent pregnancy loss and thrombophlebitis would recommend regular ambulation, continuing ASA daily and no traveling >4hrs consecutively. \par - if patient continues lovenox, would recommend transitioning to prophylactic heparin at 36wks.\par - has a follow up appointment with hematologist on 10/5\par \par #previable  delivery\par - patient had previable PPROM and back pain at 20 weeks and was induced and delivered vaginally\par - Serial cervical lengths are also recommended in this population to start between 14 and 16 weeks gestation. This should occur approximately every 2 weeks assuming that the cervical length remains greater than 3 cm and should continue until approximately 24 weeks. Should the cervical length shorten to 2.5 - 3 cm then surveillance should increase to a weekly basis. Should it shorten to < 2.5 cm then her options should be discussed. \par - s/p normal carrier testing, no cytogenetics done\par \par #pregnancy\par - prenatal vitamin\par - PO hydration and ambulation encouraged\par - bleeding precautions discussed\par \par Return to high risk clinic prn.  Schedule for nuchal in 5-6 wks and serial cervical lengths starting at 16 weeks up to 24 weeks.\par \par Dr. Mathew aware.

## 2021-09-24 NOTE — OB HISTORY
[DIANNA: ___] : DIANNA: [unfilled] [Spontaneous] : Spontaneous conception [Pregnancy History] : patient received anesthesia [LMP: ___] : LMP: [unfilled] [EGA: ___ wks] : EGA: [unfilled] wks [___] : no pregnancy complications reported

## 2021-09-24 NOTE — END OF VISIT
[] : Resident [Time Spent: ___ minutes] : I have spent [unfilled] minutes of time on the encounter. [FreeTextEntry3] : Has had 2 consecutive pregnancy losses etiology of which is not clear. Discussed causes of pregnancy loss. Advise weekly cervical length screening starting at 16 weeks. Patient had the opportunity to ask questions and they were answered.

## 2021-09-25 VITALS
SYSTOLIC BLOOD PRESSURE: 130 MMHG | RESPIRATION RATE: 16 BRPM | OXYGEN SATURATION: 99 % | HEART RATE: 85 BPM | DIASTOLIC BLOOD PRESSURE: 70 MMHG | TEMPERATURE: 98 F

## 2021-09-25 LAB
BACTERIA # UR AUTO: NEGATIVE — SIGNIFICANT CHANGE UP
BLD GP AB SCN SERPL QL: SIGNIFICANT CHANGE UP
COD CRY URNS QL: ABNORMAL
EPI CELLS # UR: 2 /HPF — SIGNIFICANT CHANGE UP (ref 0–5)
HCG SERPL-ACNC: HIGH MIU/ML
HYALINE CASTS # UR AUTO: 1 /LPF — SIGNIFICANT CHANGE UP (ref 0–7)
RBC CASTS # UR COMP ASSIST: 3 /HPF — SIGNIFICANT CHANGE UP (ref 0–4)
WBC UR QL: 2 /HPF — SIGNIFICANT CHANGE UP (ref 0–5)

## 2021-09-25 PROCEDURE — 76830 TRANSVAGINAL US NON-OB: CPT | Mod: 26

## 2021-09-25 NOTE — ED PROVIDER NOTE - CARE PROVIDER_API CALL
Alban Mathew)  MaternalFetal Medicine; Obstetrics and Gynecology  [address] [place] [address]  Phone: [phone]  Fax: [phone]  Follow Up Time: 1-3 Days    Joseph Mendoza  OBSTETRICS AND GYNECOLOGY  [address] [place] [id number]  Phone: [phone]  Fax: [phone]  Follow Up Time: 1-3 Days

## 2021-09-25 NOTE — ED PROVIDER NOTE - CLINICAL SUMMARY MEDICAL DECISION MAKING FREE TEXT BOX
Pt that currently 6 wks pregnant with lower abd pain and diarrhea. Required labs, imaging. No acute findings. Will be dc.ed with outpt OB f/up.

## 2021-09-25 NOTE — ED PROVIDER NOTE - CARE PROVIDERS DIRECT ADDRESSES
,[email or web address],DirectAddress_Unknown

## 2021-09-25 NOTE — ED PROVIDER NOTE - NS ED ROS FT
Constitutional: no fever, chills, no recent weight loss, change in appetite or malaise  Eyes: no redness/discharge/pain/vision changes  ENT: no rhinorrhea/ear pain/sore throat  Cardiac: No chest pain, SOB or edema.  Respiratory: No cough or respiratory distress  GI: + lower abd cramping, diarrhea x 2. No n/v  : No dysuria, frequency, urgency or hematuria  MS: no pain to back or extremities, no loss of ROM, no weakness  Neuro: No headache or weakness. No LOC.  Skin: No skin rash.  Endocrine: No history of thyroid disease or diabetes.  Except as documented in the HPI, all other systems are negative.

## 2021-09-25 NOTE — ED PROVIDER NOTE - ATTENDING CONTRIBUTION TO CARE
I personally evaluated the patient. I reviewed the Resident’s or Physician Assistant’s note (as assigned above), and agree with the findings and plan except as documented in my note.  31 F with APS, hx of 1 elective abortions and 2 miscarriages, currently 6 wks pregnant and started on prophylactic Lovenox today with complaints of several hours of 2 episodes of non bloody diarrhea, and lower abd pain that has resolved. No associated nausea, vomiting, trauma, vaginal bleeding or discharge, no urinary complaints. VS reviewed, pt non-toxic appearing, NAD. Head ncat, MMM, neck supple, normal ROM, normal s1s2 without any murmurs, Lungs CTAB with normal work of breathing. abd +BS, s/nd/nttp, extremities wnl, neuro exam grossly normal. No acute skin rashes. Plan is labs, meds and needed, pregnancy evaluation with US and manage accordingly.

## 2021-09-25 NOTE — ED PROVIDER NOTE - NSFOLLOWUPINSTRUCTIONS_ED_ALL_ED_FT
Please follow up with your OBGYN and primary care physician within 24-72 hours and return immediately if symptoms worsen.    Ovarian Cyst    WHAT YOU NEED TO KNOW:    What is an ovarian cyst? An ovarian cyst is a sac that grows on an ovary. This sac usually contains fluid, but may sometimes have blood or tissue in it. Most ovarian cysts are harmless and go away without treatment in a few months. Some cysts can grow large, cause pain, or break open.    What causes an ovarian cyst? Most ovarian cysts form during or after ovulation. Right before ovulation, your ovary forms a follicle. A follicle is a fluid-filled blister that has an egg inside. During ovulation, the follicle breaks open and releases the egg. A cyst may form if the follicle does not break open to release the egg. A cyst may form if the follicle opens to release the egg, but then closes and collects fluid. Endometriosis can also cause a cyst to form. Endometriosis is a condition in which tissue from your uterus grows on your ovaries. Some of this tissue may develop into a cyst.    What are the signs and symptoms of an ovarian cyst? You may not feel anything or know that you have a cyst, or you may have any of the following:    Severe pain in your lower abdomen and pelvic area that may be sharp and sudden or feel like a dull ache      Fullness and swelling in your lower abdomen       Infertility (not able to get pregnant)      Late or painful periods      Small amounts of bleeding between your periods      Lower abdominal or pelvic pain during sex      Nausea or vomiting    How is an ovarian cyst diagnosed?     Blood tests may include hormone levels, tests for cancer, and a pregnancy test.       Ultrasound pictures may show a cyst on your ovary. For this test, an ultrasound wand is inserted into your vagina and guided up toward your uterus. This helps your healthcare provider get a closer look at your ovaries.     How is an ovarian cyst treated? Treatment will depend on your age, test results, and the kind of cyst you have. Your healthcare provider may wait to see if your ovarian cyst will go away without treatment. You may be given hormone medicine, such as birth control pills. This medicine may help to control your periods, shrink a cyst, and prevent new cysts from forming. You may need surgery to have the cyst removed.    Call 911 for any of the following:     You are too weak or dizzy to stand up.        When should I seek immediate care?     You have severe abdominal pain. The pain may be sharp and sudden.      You have a fever.    When should I contact my healthcare provider?     Your periods are early, late, or more painful than usual.      You have bleeding from your vagina that is not your period.      You have abdominal pain all the time.      Your abdomen is swollen.      You have feelings of fullness, pressure, or discomfort in your abdomen.      You have trouble urinating or emptying your bladder completely.      You have pain during intercourse.      You are losing weight without trying.      You have questions or concerns about your condition or care.    CARE AGREEMENT:    You have the right to help plan your care. Learn about your health condition and how it may be treated. Discuss treatment options with your healthcare providers to decide what care you want to receive. You always have the right to refuse treatment.

## 2021-09-25 NOTE — ED PROVIDER NOTE - CARE PLAN
Principal Discharge DX:	Ovarian cyst   1 Principal Discharge DX:	Noninfectious diarrhea  Secondary Diagnosis:	Currently pregnant

## 2021-09-25 NOTE — ED PROVIDER NOTE - PROVIDER TOKENS
PROVIDER:[TOKEN:[[id number]],FOLLOWUP:[1-3 Days]],PROVIDER:[TOKEN:[[id number]],FOLLOWUP:[1-3 Days]]

## 2021-09-25 NOTE — ED PROVIDER NOTE - PHYSICAL EXAMINATION
CONSTITUTIONAL: Well-appearing; well-nourished; in no apparent distress.   EYES: PERRL; EOM intact.   ENT: normal nose; no rhinorrhea; normal pharynx with no tonsillar hypertrophy.   NECK: Supple; non-tender; no cervical lymphadenopathy.  CARDIOVASCULAR: Normal S1, S2; no murmurs, rubs, or gallops.   RESPIRATORY: Normal chest excursion with respiration; breath sounds clear and equal bilaterally; no wheezes, rhonchi, or rales.  GI/: Normal bowel sounds; non-distended; non-tender; no palpable organomegaly.   Pelvic exam: chaperoned by RN; No external abnl. No vaginal bleeding. Cervical os closed. No CMT/adnexa ttp  MS: No evidence of trauma or deformity. Normal ROM in all four extremities; non-tender to palpation; distal pulses are normal.   SKIN: Normal for age and race; warm; dry; good turgor; no apparent lesions or exudate.   NEURO/PSYCH: A & O x 4; grossly unremarkable. mood and manner are appropriate.

## 2021-09-25 NOTE — ED PROVIDER NOTE - OBJECTIVE STATEMENT
31 year old  F (1 /2 prior miscarriages) currently 6 weeks pregnant confirmed with US c/o lower abd cramping. sts after eating dinner tonight had episode of lower abd cramping and 2 episodes of non bloody watery diarrhea. Sts cramping has since resolved. Pt is following with high risk pregnancy specialist Dr. Mathew, had kaylynn today with nl ultrasound. Pt on asa 81 and lovenox. Denies any vaginal bleeding, urinary symptoms, nausea, vomiting, fever/chills, back pain, sick contacts, recent travel.

## 2021-09-25 NOTE — ED PROVIDER NOTE - PATIENT PORTAL LINK FT
You can access the FollowMyHealth Patient Portal offered by [facility] by registering at the following website: [email or web address]. By joining [employer]’s FollowMyHealth portal, you will also be able to view your health information using other applications (apps) compatible with our system.

## 2021-09-26 LAB
CULTURE RESULTS: SIGNIFICANT CHANGE UP
SPECIMEN SOURCE: SIGNIFICANT CHANGE UP

## 2021-09-29 DIAGNOSIS — O09.299 SUPERVISION OF PREGNANCY WITH OTHER POOR REPRODUCTIVE OR OBSTETRIC HISTORY, UNSPECIFIED TRIMESTER: ICD-10-CM

## 2021-09-29 DIAGNOSIS — O09.90 SUPERVISION OF HIGH RISK PREGNANCY, UNSPECIFIED, UNSPECIFIED TRIMESTER: ICD-10-CM

## 2021-09-29 DIAGNOSIS — Z3A.01 LESS THAN 8 WEEKS GESTATION OF PREGNANCY: ICD-10-CM

## 2021-09-29 DIAGNOSIS — Z82.49 FAMILY HISTORY OF ISCHEMIC HEART DISEASE AND OTHER DISEASES OF THE CIRCULATORY SYSTEM: ICD-10-CM

## 2021-11-13 ENCOUNTER — EMERGENCY (EMERGENCY)
Facility: HOSPITAL | Age: 31
LOS: 0 days | Discharge: HOME | End: 2021-11-14
Attending: EMERGENCY MEDICINE | Admitting: EMERGENCY MEDICINE
Payer: COMMERCIAL

## 2021-11-13 VITALS
RESPIRATION RATE: 20 BRPM | DIASTOLIC BLOOD PRESSURE: 54 MMHG | TEMPERATURE: 97 F | OXYGEN SATURATION: 100 % | WEIGHT: 149.91 LBS | HEIGHT: 63 IN | SYSTOLIC BLOOD PRESSURE: 97 MMHG | HEART RATE: 93 BPM

## 2021-11-13 DIAGNOSIS — O20.9 HEMORRHAGE IN EARLY PREGNANCY, UNSPECIFIED: ICD-10-CM

## 2021-11-13 DIAGNOSIS — Z3A.14 14 WEEKS GESTATION OF PREGNANCY: ICD-10-CM

## 2021-11-13 DIAGNOSIS — O20.0 THREATENED ABORTION: ICD-10-CM

## 2021-11-13 DIAGNOSIS — Z98.890 OTHER SPECIFIED POSTPROCEDURAL STATES: Chronic | ICD-10-CM

## 2021-11-13 LAB
ALBUMIN SERPL ELPH-MCNC: 3.9 G/DL — SIGNIFICANT CHANGE UP (ref 3.5–5.2)
ALP SERPL-CCNC: 60 U/L — SIGNIFICANT CHANGE UP (ref 30–115)
ALT FLD-CCNC: 29 U/L — SIGNIFICANT CHANGE UP (ref 0–41)
ANION GAP SERPL CALC-SCNC: 14 MMOL/L — SIGNIFICANT CHANGE UP (ref 7–14)
AST SERPL-CCNC: 21 U/L — SIGNIFICANT CHANGE UP (ref 0–41)
BASOPHILS # BLD AUTO: 0.02 K/UL — SIGNIFICANT CHANGE UP (ref 0–0.2)
BASOPHILS NFR BLD AUTO: 0.2 % — SIGNIFICANT CHANGE UP (ref 0–1)
BILIRUB SERPL-MCNC: <0.2 MG/DL — SIGNIFICANT CHANGE UP (ref 0.2–1.2)
BUN SERPL-MCNC: 10 MG/DL — SIGNIFICANT CHANGE UP (ref 10–20)
CALCIUM SERPL-MCNC: 9.5 MG/DL — SIGNIFICANT CHANGE UP (ref 8.5–10.1)
CHLORIDE SERPL-SCNC: 102 MMOL/L — SIGNIFICANT CHANGE UP (ref 98–110)
CO2 SERPL-SCNC: 17 MMOL/L — SIGNIFICANT CHANGE UP (ref 17–32)
CREAT SERPL-MCNC: 0.6 MG/DL — LOW (ref 0.7–1.5)
EOSINOPHIL # BLD AUTO: 0.08 K/UL — SIGNIFICANT CHANGE UP (ref 0–0.7)
EOSINOPHIL NFR BLD AUTO: 1 % — SIGNIFICANT CHANGE UP (ref 0–8)
GLUCOSE SERPL-MCNC: 92 MG/DL — SIGNIFICANT CHANGE UP (ref 70–99)
HCT VFR BLD CALC: 33.6 % — LOW (ref 37–47)
HGB BLD-MCNC: 11.5 G/DL — LOW (ref 12–16)
IMM GRANULOCYTES NFR BLD AUTO: 0.4 % — HIGH (ref 0.1–0.3)
LYMPHOCYTES # BLD AUTO: 2.42 K/UL — SIGNIFICANT CHANGE UP (ref 1.2–3.4)
LYMPHOCYTES # BLD AUTO: 29.1 % — SIGNIFICANT CHANGE UP (ref 20.5–51.1)
MCHC RBC-ENTMCNC: 29.9 PG — SIGNIFICANT CHANGE UP (ref 27–31)
MCHC RBC-ENTMCNC: 34.2 G/DL — SIGNIFICANT CHANGE UP (ref 32–37)
MCV RBC AUTO: 87.3 FL — SIGNIFICANT CHANGE UP (ref 81–99)
MONOCYTES # BLD AUTO: 0.69 K/UL — HIGH (ref 0.1–0.6)
MONOCYTES NFR BLD AUTO: 8.3 % — SIGNIFICANT CHANGE UP (ref 1.7–9.3)
NEUTROPHILS # BLD AUTO: 5.08 K/UL — SIGNIFICANT CHANGE UP (ref 1.4–6.5)
NEUTROPHILS NFR BLD AUTO: 61 % — SIGNIFICANT CHANGE UP (ref 42.2–75.2)
NRBC # BLD: 0 /100 WBCS — SIGNIFICANT CHANGE UP (ref 0–0)
PLATELET # BLD AUTO: 173 K/UL — SIGNIFICANT CHANGE UP (ref 130–400)
POTASSIUM SERPL-MCNC: 3.8 MMOL/L — SIGNIFICANT CHANGE UP (ref 3.5–5)
POTASSIUM SERPL-SCNC: 3.8 MMOL/L — SIGNIFICANT CHANGE UP (ref 3.5–5)
PROT SERPL-MCNC: 6.4 G/DL — SIGNIFICANT CHANGE UP (ref 6–8)
RBC # BLD: 3.85 M/UL — LOW (ref 4.2–5.4)
RBC # FLD: 14.1 % — SIGNIFICANT CHANGE UP (ref 11.5–14.5)
SODIUM SERPL-SCNC: 133 MMOL/L — LOW (ref 135–146)
WBC # BLD: 8.32 K/UL — SIGNIFICANT CHANGE UP (ref 4.8–10.8)
WBC # FLD AUTO: 8.32 K/UL — SIGNIFICANT CHANGE UP (ref 4.8–10.8)

## 2021-11-13 PROCEDURE — 99285 EMERGENCY DEPT VISIT HI MDM: CPT

## 2021-11-13 RX ORDER — SODIUM CHLORIDE 9 MG/ML
1000 INJECTION INTRAMUSCULAR; INTRAVENOUS; SUBCUTANEOUS ONCE
Refills: 0 | Status: COMPLETED | OUTPATIENT
Start: 2021-11-13 | End: 2021-11-13

## 2021-11-13 RX ADMIN — SODIUM CHLORIDE 1000 MILLILITER(S): 9 INJECTION INTRAMUSCULAR; INTRAVENOUS; SUBCUTANEOUS at 23:54

## 2021-11-14 VITALS
RESPIRATION RATE: 20 BRPM | OXYGEN SATURATION: 99 % | TEMPERATURE: 100 F | DIASTOLIC BLOOD PRESSURE: 63 MMHG | SYSTOLIC BLOOD PRESSURE: 110 MMHG | HEART RATE: 85 BPM

## 2021-11-14 LAB
APPEARANCE UR: CLEAR — SIGNIFICANT CHANGE UP
APTT BLD: 29.1 SEC — SIGNIFICANT CHANGE UP (ref 27–39.2)
BACTERIA # UR AUTO: NEGATIVE — SIGNIFICANT CHANGE UP
BILIRUB UR-MCNC: NEGATIVE — SIGNIFICANT CHANGE UP
BLD GP AB SCN SERPL QL: SIGNIFICANT CHANGE UP
COLOR SPEC: SIGNIFICANT CHANGE UP
DIFF PNL FLD: ABNORMAL
EPI CELLS # UR: 0 /HPF — SIGNIFICANT CHANGE UP (ref 0–5)
GLUCOSE UR QL: NEGATIVE — SIGNIFICANT CHANGE UP
HCG SERPL-ACNC: HIGH MIU/ML
HYALINE CASTS # UR AUTO: 0 /LPF — SIGNIFICANT CHANGE UP (ref 0–7)
INR BLD: 0.96 RATIO — SIGNIFICANT CHANGE UP (ref 0.65–1.3)
KETONES UR-MCNC: NEGATIVE — SIGNIFICANT CHANGE UP
LEUKOCYTE ESTERASE UR-ACNC: NEGATIVE — SIGNIFICANT CHANGE UP
NITRITE UR-MCNC: NEGATIVE — SIGNIFICANT CHANGE UP
PH UR: 6.5 — SIGNIFICANT CHANGE UP (ref 5–8)
PROT UR-MCNC: NEGATIVE — SIGNIFICANT CHANGE UP
PROTHROM AB SERPL-ACNC: 11.1 SEC — SIGNIFICANT CHANGE UP (ref 9.95–12.87)
RBC CASTS # UR COMP ASSIST: 1 /HPF — SIGNIFICANT CHANGE UP (ref 0–4)
SP GR SPEC: 1.01 — SIGNIFICANT CHANGE UP (ref 1.01–1.03)
UROBILINOGEN FLD QL: SIGNIFICANT CHANGE UP
WBC UR QL: 1 /HPF — SIGNIFICANT CHANGE UP (ref 0–5)

## 2021-11-14 PROCEDURE — 76830 TRANSVAGINAL US NON-OB: CPT | Mod: 26

## 2021-11-14 NOTE — ED PROVIDER NOTE - PHYSICAL EXAMINATION
CONSTITUTIONAL: Well-developed; well-nourished; in no acute distress, nontoxic appearing  SKIN: skin exam is warm and dry,  HEAD: Normocephalic; atraumatic.  EYES: PERRL, 3 mm bilateral, no nystagmus, EOM intact; conjunctiva and sclera clear.  ENT: MMM, no nasal congestion  NECK: Supple; non tender.+ full passive ROM in all directions. No JVD  CARD: S1, S2 normal, no murmur  RESP: No wheezes, rales or rhonchi. Good air movement bilaterally  ABD: soft; non-distended; non-tender. No Rebound, No guarding  EXT: Normal ROM. No cyanosis or edema. Dp Pulses intact.   NEURO: awake, alert, following commands, oriented, grossly unremarkable. No Focal deficits. GCS 15.   PSYCH: Cooperative, appropriate. CONSTITUTIONAL: Well-developed; well-nourished; in no acute distress, nontoxic appearing  SKIN: skin exam is warm and dry,  HEAD: Normocephalic; atraumatic.  EYES: PERRL, 3 mm bilateral, no nystagmus, EOM intact; conjunctiva and sclera clear.  ENT: MMM, no nasal congestion  NECK: Supple; non tender.+ full passive ROM in all directions. No JVD  CARD: S1, S2 normal, no murmur  RESP: No wheezes, rales or rhonchi. Good air movement bilaterally  ABD: soft; non-distended; non-tender. No Rebound, No guarding  PELVIC: normal ext genitalia, + blood in vaginal vault, os closed, no cmt, no uterine/adnexal tenderness  EXT: Normal ROM. No cyanosis or edema. Dp Pulses intact.   NEURO: awake, alert, following commands, oriented, grossly unremarkable. No Focal deficits. GCS 15.   PSYCH: Cooperative, appropriate.

## 2021-11-14 NOTE — ED ADULT NURSE NOTE - OBJECTIVE STATEMENT
came in c/o vaginal bleeding since 9:30 pm . Patient is currently 14 weeks pregnant and is on lovenox and aspirin .H/o 2 abortions noted . As per patient ,she been having headache and felt like going to bathroom then started to bleed denied any cramping or pain

## 2021-11-14 NOTE — ED PROVIDER NOTE - OBJECTIVE STATEMENT
30 y/o pregnant female  @ 14w 2d, in ER with c/o VB.  Pt on lovenox/low dose ASA.  States bleeding started earlier today, felt some abdominal bloating, she went to go to bathroom and noted she was bleeding.  No pelvic pain/cramping.  + mild nausea, no v/d.  no urinary symptoms.  no cp/sob.  no palpitations.  no ha/dizziness/syncope/near syncope. 32 y/o pregnant female  (1 termination, 1 miscarriage @ 6 weeks, 1 loss at 20 weeks) currenlty  @ 14w 2d (DIANNA 5/13/21), in ER with c/o VB.  Pt on lovenox/low dose ASA.  States bleeding started earlier today, felt some abdominal bloating, she went to go to bathroom and noted she was bleeding.  No pelvic pain/cramping.  + mild nausea, no v/d.  no urinary symptoms.  no cp/sob.  no palpitations.  no ha/dizziness/syncope/near syncope. Pt with h/o 2 prior miscarriages, 1 at 20 weeks, follows with Dr. Mathew.

## 2021-11-14 NOTE — CONSULT NOTE ADULT - SUBJECTIVE AND OBJECTIVE BOX
OBYGN PGY2    Chief Complaint: vaginal bleeding    HPI:   32yo  @13w6d by 1st trimester george, DIANNA 22, presented with vaginal bleeding at 2130. Patient reports going to the bathroom feeling like passing gas and started to have a trickle of blood run down her leg with passage of clots. Currently states having light spotting. Denies recent intercourse or abdominal trauma. Denies contractions or lower abdominal/pelvic pain. Reports epigastric discomfort, which she's had for a while during pregnancy. Denies leakage of fluid. H/o multiple pregnancy loss, including 20wk loss preceded by PPROM, and is currently on ASA 81mg daily (last dose 11am yesterday) and prophylactic lovenox 40mg qd (last dose 11am yesterday). H/o superficial thrombophlebitis in . Last meal was 1700 yesterday, last BM was yesterday with constipation, last sexual intercourse was in first few weeks of pregnancy. Denies fever, chills, nausea, vomiting. Denies abnormal vaginal discharge. Denies dysuria, hematuria, urgency.     Ob/Gyn History:                   LMP - 21                    1) etop with D&C, 2) vaginal delivery @20w, PPROM, PTL, 3) SAB @6w, no D&C  Denies history of ovarian cysts, uterine fibroids, abnormal paps, or STIs    Denies the following: constitutional symptoms, visual symptoms, cardiovascular symptoms, respiratory symptoms, GI symptoms, musculoskeletal symptoms, skin symptoms, neurologic symptoms, hematologic symptoms, allergic symptoms, psychiatric symptoms  Except any pertinent positives listed.     Home Medications:  ASA 81mg  Lovenox 40mg    Allergies:  No Known Allergies      PAST MEDICAL & SURGICAL HISTORY:  No pertinent past medical history    History of D&amp;C        FAMILY HISTORY:  CVA, DM, HTN, Breast cancer, colon cancer    SOCIAL HISTORY: Denies cigarette use, alcohol use, or illicit drug use    Vital Signs Last 24 Hrs  T(F): 99.5 (2021 00:27), Max: 99.5 (2021 00:27)  HR: 85 (2021 00:27) (85 - 93)  BP: 110/63 (2021 00:27) (97/54 - 110/63)  RR: 20 (2021 00:27) (20 - 20)    General Appearance - AAOx3, NAD  Heart - S1S2 regular rate and rhythm  Lung - CTA Bilaterally  Abdomen - Soft, nontender, nondistended, no rebound, no rigidity, no guarding, bowel sounds present    GYN/Pelvis:  External genitalia: normal, no lesions  Vagina: 5cc dark red blood  Cervix: closed, negative CMT, slow oozing of blood from cervical os  Uterus: no fundal tenderness  Adnexa: no adnexal tenderness or fullness palpated     LABS:                        11.5   8.32  )-----------( 173      ( 2021 23:13 )             33.6     HCG Quantitative, Serum: 80895.0 mIU/mL (21 @ 23:13)    Antibody Screen: NEG (21 @ 23:13)  ABO RH Interpretation: O POS (21 @ 23:13)        133<L>  |  102  |  10  ----------------------------<  92  3.8   |  17  |  0.6<L>    Ca    9.5      2021 23:13    TPro  6.4  /  Alb  3.9  /  TBili  <0.2  /  DBili  x   /  AST  21  /  ALT  29  /  AlkPhos  60      PT/INR - ( 2021 23:13 )   PT: 11.10 sec;   INR: 0.96 ratio         PTT - ( 2021 23:13 )  PTT:29.1 sec  Urinalysis Basic - ( 2021 04:14 )    Color: Light Yellow / Appearance: Clear / S.013 / pH: x  Gluc: x / Ketone: Negative  / Bili: Negative / Urobili: <2 mg/dL   Blood: x / Protein: Negative / Nitrite: Negative   Leuk Esterase: Negative / RBC: 1 /HPF / WBC 1 /HPF   Sq Epi: x / Non Sq Epi: 0 /HPF / Bacteria: Negative          RADIOLOGY & ADDITIONAL STUDIES:  pending read  OBYGN PGY2    Chief Complaint: vaginal bleeding    HPI:   30yo  @13w6d by 1st trimester george, DIANNA 22, presented with vaginal bleeding at 2130. Patient reports going to the bathroom feeling like passing gas and started to have a trickle of blood run down her leg with passage of clots. Currently states having light spotting. Denies recent intercourse or abdominal trauma. Denies contractions or lower abdominal/pelvic pain. Reports epigastric discomfort, which she's had for a while during pregnancy. Denies leakage of fluid. H/o recurrent pregnancy loss, including 20wk loss preceded by PPROM, and is currently on ASA 81mg daily (last dose 11am yesterday) and prophylactic lovenox 40mg qd (last dose 11am yesterday). H/o superficial thrombophlebitis in . Last meal was 1700 yesterday, last BM was yesterday with constipation, last sexual intercourse was in first few weeks of pregnancy. Denies fever, chills, nausea, vomiting. Denies abnormal vaginal discharge. Denies dysuria, hematuria, urgency.     Ob/Gyn History:                   LMP - 21                    1) etop with D&C, 2) vaginal delivery @20w, PPROM, PTL, 3) SAB @6w, no D&C  Denies history of ovarian cysts, uterine fibroids, abnormal paps, or STIs    Denies the following: constitutional symptoms, visual symptoms, cardiovascular symptoms, respiratory symptoms, GI symptoms, musculoskeletal symptoms, skin symptoms, neurologic symptoms, hematologic symptoms, allergic symptoms, psychiatric symptoms  Except any pertinent positives listed.     Home Medications:  ASA 81mg  Lovenox 40mg    Allergies:  No Known Allergies      PAST MEDICAL & SURGICAL HISTORY:  No pertinent past medical history    History of D&amp;C        FAMILY HISTORY:  CVA, DM, HTN, Breast cancer, colon cancer    SOCIAL HISTORY: Denies cigarette use, alcohol use, or illicit drug use    Vital Signs Last 24 Hrs  T(F): 99.5 (2021 00:27), Max: 99.5 (2021 00:27)  HR: 85 (2021 00:27) (85 - 93)  BP: 110/63 (2021 00:27) (97/54 - 110/63)  RR: 20 (2021 00:27) (20 - 20)    General Appearance - AAOx3, NAD  Heart - S1S2 regular rate and rhythm  Lung - CTA Bilaterally  Abdomen - Soft, nontender, nondistended, no rebound, no rigidity, no guarding, bowel sounds present    GYN/Pelvis:  External genitalia: normal, no lesions  Vagina: 5cc dark red blood  Cervix: closed, negative CMT, slow oozing of blood from cervical os  Uterus: no fundal tenderness  Adnexa: no adnexal tenderness or fullness palpated     LABS:                        11.5   8.32  )-----------( 173      ( 2021 23:13 )             33.6     HCG Quantitative, Serum: 76426.0 mIU/mL (21 @ 23:13)    Antibody Screen: NEG (21 @ 23:13)  ABO RH Interpretation: O POS (21 @ 23:13)        133<L>  |  102  |  10  ----------------------------<  92  3.8   |  17  |  0.6<L>    Ca    9.5      2021 23:13    TPro  6.4  /  Alb  3.9  /  TBili  <0.2  /  DBili  x   /  AST  21  /  ALT  29  /  AlkPhos  60      PT/INR - ( 2021 23:13 )   PT: 11.10 sec;   INR: 0.96 ratio         PTT - ( 2021 23:13 )  PTT:29.1 sec  Urinalysis Basic - ( 2021 04:14 )    Color: Light Yellow / Appearance: Clear / S.013 / pH: x  Gluc: x / Ketone: Negative  / Bili: Negative / Urobili: <2 mg/dL   Blood: x / Protein: Negative / Nitrite: Negative   Leuk Esterase: Negative / RBC: 1 /HPF / WBC 1 /HPF   Sq Epi: x / Non Sq Epi: 0 /HPF / Bacteria: Negative          RADIOLOGY & ADDITIONAL STUDIES:  pending read  OBYGN PGY2    Chief Complaint: vaginal bleeding    HPI:   30yo  @13w6d by 1st trimester george, DIANNA 22, presented with vaginal bleeding at 2130. Patient reports going to the bathroom feeling like passing gas and started to have a trickle of blood run down her leg with passage of clots. Currently states having light spotting. Denies recent intercourse or abdominal trauma. Denies contractions or lower abdominal/pelvic pain. Reports epigastric discomfort, which she's had for a while during pregnancy. Denies leakage of fluid. H/o recurrent pregnancy loss, including 20wk loss preceded by PPROM, and is currently on ASA 81mg daily (last dose 11am yesterday) and prophylactic lovenox 40mg qd (last dose 11am yesterday). H/o superficial thrombophlebitis in . Last meal was 1700 yesterday, last BM was yesterday with constipation, last sexual intercourse was in first few weeks of pregnancy. Denies fever, chills, nausea, vomiting. Denies abnormal vaginal discharge. Denies dysuria, hematuria, urgency.     Ob/Gyn History:                   LMP - 21                    1) etop with D&C, 2) vaginal delivery @20w, PPROM, PTL, 3) SAB @6w, no D&C  Denies history of ovarian cysts, uterine fibroids, abnormal paps, or STIs    Denies the following: constitutional symptoms, visual symptoms, cardiovascular symptoms, respiratory symptoms, GI symptoms, musculoskeletal symptoms, skin symptoms, neurologic symptoms, hematologic symptoms, allergic symptoms, psychiatric symptoms  Except any pertinent positives listed.     Home Medications:  ASA 81mg  Lovenox 40mg    Allergies:  No Known Allergies      PAST MEDICAL & SURGICAL HISTORY:  No pertinent past medical history    History of D&amp;C        FAMILY HISTORY:  CVA, DM, HTN, Breast cancer, colon cancer    SOCIAL HISTORY: Denies cigarette use, alcohol use, or illicit drug use    Vital Signs Last 24 Hrs  T(F): 99.5 (2021 00:27), Max: 99.5 (2021 00:27)  HR: 85 (2021 00:27) (85 - 93)  BP: 110/63 (2021 00:27) (97/54 - 110/63)  RR: 20 (2021 00:27) (20 - 20)    General Appearance - AAOx3, NAD  Heart - S1S2 regular rate and rhythm  Lung - CTA Bilaterally  Abdomen - Soft, nontender, nondistended, no rebound, no rigidity, no guarding, bowel sounds present    GYN/Pelvis:  External genitalia: normal, no lesions  Vagina: 5cc dark red blood  Cervix: closed, negative CMT, slow oozing of blood from cervical os  Uterus: no fundal tenderness  Adnexa: no adnexal tenderness or fullness palpated     LABS:                        11.5   8.32  )-----------( 173      ( 2021 23:13 )             33.6     HCG Quantitative, Serum: 80975.0 mIU/mL (21 @ 23:13)    Antibody Screen: NEG (21 @ 23:13)  ABO RH Interpretation: O POS (21 @ 23:13)        133<L>  |  102  |  10  ----------------------------<  92  3.8   |  17  |  0.6<L>    Ca    9.5      2021 23:13    TPro  6.4  /  Alb  3.9  /  TBili  <0.2  /  DBili  x   /  AST  21  /  ALT  29  /  AlkPhos  60      PT/INR - ( 2021 23:13 )   PT: 11.10 sec;   INR: 0.96 ratio         PTT - ( 2021 23:13 )  PTT:29.1 sec  Urinalysis Basic - ( 2021 04:14 )    Color: Light Yellow / Appearance: Clear / S.013 / pH: x  Gluc: x / Ketone: Negative  / Bili: Negative / Urobili: <2 mg/dL   Blood: x / Protein: Negative / Nitrite: Negative   Leuk Esterase: Negative / RBC: 1 /HPF / WBC 1 /HPF   Sq Epi: x / Non Sq Epi: 0 /HPF / Bacteria: Negative          RADIOLOGY & ADDITIONAL STUDIES:  SIUP with  visualized, pending final read

## 2021-11-14 NOTE — CONSULT NOTE ADULT - ASSESSMENT
30yo  @13w6d by 1st trimester sono, DIANNA 22, with threatened , currently clinically and hemodynamically stable    - no acute GYN intervention at this time  - INCOMPLETE NOTE PENDING DISCUSSION WITH ATTENDING   30yo  @13w6d by 1st trimester sono, DIANNA 22, with threatened , currently clinically and hemodynamically stable    - no acute GYN intervention at this time  - f/u final read of ultrasound  - bleeding precautions discussed  - f/u with  outpatient on   - Discussed with  and   - Dispo per ED

## 2021-11-14 NOTE — ED PROVIDER NOTE - PATIENT PORTAL LINK FT
You can access the FollowMyHealth Patient Portal offered by [facility] by registering at the following website: [email or web address]. By joining [employer]’s FollowMyHealth portal, you will also be able to view your health information using other applications (apps) compatible with our system.

## 2021-11-14 NOTE — ED ADULT NURSE NOTE - CHIEF COMPLAINT QUOTE
pt biba, c/o havy vaginal bleding that started 30 mins pta; pt is 14 weeks pregnant; denies pain, c/o nausea; pt sts her Dr put her on lovenox and baby aspirin for this pregnancy

## 2021-11-14 NOTE — ED PROVIDER NOTE - NS ED ROS FT
Constitutional:  no fevers, no chills, no malaise  Eyes:  No visual changes  ENMT: No neck pain or stiffness, no nasal congestion, no ear pain, no throat pain  Cardiac:  No chest pain  Respiratory:  No cough or sob  GI:  see hpi  : see hpi  MS:  No back pain, no joint pain.  Neuro:  No headache, no dizziness, no change in mental status  Skin:  No skin rash  Except as documented in the HPI,  all other systems are negative

## 2021-11-14 NOTE — ED PROVIDER NOTE - CARE PROVIDER_API CALL
Joseph Mendoza  OBSTETRICS AND GYNECOLOGY  [address] [place] [address]  Phone: [phone]  Fax: [phone]  Follow Up Time:

## 2021-11-14 NOTE — ED PROVIDER NOTE - CLINICAL SUMMARY MEDICAL DECISION MAKING FREE TEXT BOX
30yo  @13w6d by 1st trimester sono, DIANNA 22, with threatened , currently clinically and hemodynamically stable.  No acute GYN intervention at this time.  Bleeding precautions discussed.  F/U with Dr. Vitale as outpatient on .

## 2021-11-30 ENCOUNTER — ASOB RESULT (OUTPATIENT)
Age: 31
End: 2021-11-30

## 2021-11-30 ENCOUNTER — OUTPATIENT (OUTPATIENT)
Dept: OUTPATIENT SERVICES | Facility: HOSPITAL | Age: 31
LOS: 1 days | Discharge: HOME | End: 2021-11-30

## 2021-11-30 ENCOUNTER — APPOINTMENT (OUTPATIENT)
Dept: ANTEPARTUM | Facility: CLINIC | Age: 31
End: 2021-11-30
Payer: COMMERCIAL

## 2021-11-30 DIAGNOSIS — Z98.890 OTHER SPECIFIED POSTPROCEDURAL STATES: Chronic | ICD-10-CM

## 2021-11-30 PROCEDURE — 76817 TRANSVAGINAL US OBSTETRIC: CPT | Mod: 26

## 2021-12-14 ENCOUNTER — ASOB RESULT (OUTPATIENT)
Age: 31
End: 2021-12-14

## 2021-12-14 ENCOUNTER — OUTPATIENT (OUTPATIENT)
Dept: OUTPATIENT SERVICES | Facility: HOSPITAL | Age: 31
LOS: 1 days | Discharge: HOME | End: 2021-12-14

## 2021-12-14 ENCOUNTER — APPOINTMENT (OUTPATIENT)
Dept: ANTEPARTUM | Facility: CLINIC | Age: 31
End: 2021-12-14
Payer: COMMERCIAL

## 2021-12-14 DIAGNOSIS — Z98.890 OTHER SPECIFIED POSTPROCEDURAL STATES: Chronic | ICD-10-CM

## 2021-12-14 PROCEDURE — 76817 TRANSVAGINAL US OBSTETRIC: CPT | Mod: 26

## 2021-12-17 ENCOUNTER — EMERGENCY (EMERGENCY)
Facility: HOSPITAL | Age: 31
LOS: 0 days | Discharge: HOME | End: 2021-12-18
Attending: STUDENT IN AN ORGANIZED HEALTH CARE EDUCATION/TRAINING PROGRAM | Admitting: STUDENT IN AN ORGANIZED HEALTH CARE EDUCATION/TRAINING PROGRAM
Payer: COMMERCIAL

## 2021-12-17 VITALS
SYSTOLIC BLOOD PRESSURE: 98 MMHG | WEIGHT: 138.89 LBS | DIASTOLIC BLOOD PRESSURE: 54 MMHG | HEART RATE: 111 BPM | HEIGHT: 63 IN | RESPIRATION RATE: 18 BRPM | OXYGEN SATURATION: 100 % | TEMPERATURE: 99 F

## 2021-12-17 DIAGNOSIS — R11.0 NAUSEA: ICD-10-CM

## 2021-12-17 DIAGNOSIS — R50.9 FEVER, UNSPECIFIED: ICD-10-CM

## 2021-12-17 DIAGNOSIS — U07.1 COVID-19: ICD-10-CM

## 2021-12-17 DIAGNOSIS — R05.9 COUGH, UNSPECIFIED: ICD-10-CM

## 2021-12-17 DIAGNOSIS — Z98.890 OTHER SPECIFIED POSTPROCEDURAL STATES: Chronic | ICD-10-CM

## 2021-12-17 LAB
ALBUMIN SERPL ELPH-MCNC: 3.7 G/DL — SIGNIFICANT CHANGE UP (ref 3.5–5.2)
ALP SERPL-CCNC: 62 U/L — SIGNIFICANT CHANGE UP (ref 30–115)
ALT FLD-CCNC: 34 U/L — SIGNIFICANT CHANGE UP (ref 0–41)
ANION GAP SERPL CALC-SCNC: 15 MMOL/L — HIGH (ref 7–14)
AST SERPL-CCNC: 27 U/L — SIGNIFICANT CHANGE UP (ref 0–41)
BILIRUB SERPL-MCNC: <0.2 MG/DL — SIGNIFICANT CHANGE UP (ref 0.2–1.2)
BUN SERPL-MCNC: 5 MG/DL — LOW (ref 10–20)
CALCIUM SERPL-MCNC: 9.1 MG/DL — SIGNIFICANT CHANGE UP (ref 8.5–10.1)
CHLORIDE SERPL-SCNC: 101 MMOL/L — SIGNIFICANT CHANGE UP (ref 98–110)
CO2 SERPL-SCNC: 17 MMOL/L — SIGNIFICANT CHANGE UP (ref 17–32)
CREAT SERPL-MCNC: <0.5 MG/DL — LOW (ref 0.7–1.5)
GLUCOSE SERPL-MCNC: 83 MG/DL — SIGNIFICANT CHANGE UP (ref 70–99)
POTASSIUM SERPL-MCNC: 3.8 MMOL/L — SIGNIFICANT CHANGE UP (ref 3.5–5)
POTASSIUM SERPL-SCNC: 3.8 MMOL/L — SIGNIFICANT CHANGE UP (ref 3.5–5)
PROT SERPL-MCNC: 6.3 G/DL — SIGNIFICANT CHANGE UP (ref 6–8)
SODIUM SERPL-SCNC: 133 MMOL/L — LOW (ref 135–146)

## 2021-12-17 PROCEDURE — 99284 EMERGENCY DEPT VISIT MOD MDM: CPT

## 2021-12-17 PROCEDURE — 93010 ELECTROCARDIOGRAM REPORT: CPT

## 2021-12-17 RX ORDER — METOCLOPRAMIDE HCL 10 MG
10 TABLET ORAL ONCE
Refills: 0 | Status: COMPLETED | OUTPATIENT
Start: 2021-12-17 | End: 2021-12-17

## 2021-12-17 RX ORDER — SODIUM CHLORIDE 9 MG/ML
1000 INJECTION, SOLUTION INTRAVENOUS ONCE
Refills: 0 | Status: COMPLETED | OUTPATIENT
Start: 2021-12-17 | End: 2021-12-17

## 2021-12-17 NOTE — ED ADULT TRIAGE NOTE - CHIEF COMPLAINT QUOTE
Pt c/o fever (Tmax 102) and sore throat x2days. Pt states pt's bf tested +covid. pt 19 weeks pregnant. Denies any other symptoms

## 2021-12-17 NOTE — ED ADULT NURSE NOTE - OBJECTIVE STATEMENT
Pt c/o fever (Tmax 102) and sore throat x2days. Pt states pt's bf tested +covid. pt 19 weeks pregnant. Denies any other symptoms  fever, sore throat

## 2021-12-18 VITALS
OXYGEN SATURATION: 97 % | RESPIRATION RATE: 18 BRPM | TEMPERATURE: 99 F | HEART RATE: 100 BPM | SYSTOLIC BLOOD PRESSURE: 92 MMHG | DIASTOLIC BLOOD PRESSURE: 54 MMHG

## 2021-12-18 LAB
APPEARANCE UR: ABNORMAL
BACTERIA # UR AUTO: NEGATIVE — SIGNIFICANT CHANGE UP
BASOPHILS # BLD AUTO: 0.01 K/UL — SIGNIFICANT CHANGE UP (ref 0–0.2)
BASOPHILS NFR BLD AUTO: 0.2 % — SIGNIFICANT CHANGE UP (ref 0–1)
BILIRUB UR-MCNC: NEGATIVE — SIGNIFICANT CHANGE UP
COLOR SPEC: SIGNIFICANT CHANGE UP
DIFF PNL FLD: NEGATIVE — SIGNIFICANT CHANGE UP
EOSINOPHIL # BLD AUTO: 0.02 K/UL — SIGNIFICANT CHANGE UP (ref 0–0.7)
EOSINOPHIL NFR BLD AUTO: 0.3 % — SIGNIFICANT CHANGE UP (ref 0–8)
EPI CELLS # UR: 2 /HPF — SIGNIFICANT CHANGE UP (ref 0–5)
GLUCOSE UR QL: NEGATIVE — SIGNIFICANT CHANGE UP
HCG SERPL-ACNC: HIGH MIU/ML
HCT VFR BLD CALC: 33.1 % — LOW (ref 37–47)
HGB BLD-MCNC: 11.2 G/DL — LOW (ref 12–16)
HYALINE CASTS # UR AUTO: 0 /LPF — SIGNIFICANT CHANGE UP (ref 0–7)
IMM GRANULOCYTES NFR BLD AUTO: 1 % — HIGH (ref 0.1–0.3)
KETONES UR-MCNC: ABNORMAL
LEUKOCYTE ESTERASE UR-ACNC: NEGATIVE — SIGNIFICANT CHANGE UP
LYMPHOCYTES # BLD AUTO: 1.18 K/UL — LOW (ref 1.2–3.4)
LYMPHOCYTES # BLD AUTO: 19.8 % — LOW (ref 20.5–51.1)
MCHC RBC-ENTMCNC: 30.2 PG — SIGNIFICANT CHANGE UP (ref 27–31)
MCHC RBC-ENTMCNC: 33.8 G/DL — SIGNIFICANT CHANGE UP (ref 32–37)
MCV RBC AUTO: 89.2 FL — SIGNIFICANT CHANGE UP (ref 81–99)
MONOCYTES # BLD AUTO: 0.78 K/UL — HIGH (ref 0.1–0.6)
MONOCYTES NFR BLD AUTO: 13.1 % — HIGH (ref 1.7–9.3)
NEUTROPHILS # BLD AUTO: 3.9 K/UL — SIGNIFICANT CHANGE UP (ref 1.4–6.5)
NEUTROPHILS NFR BLD AUTO: 65.6 % — SIGNIFICANT CHANGE UP (ref 42.2–75.2)
NITRITE UR-MCNC: NEGATIVE — SIGNIFICANT CHANGE UP
NRBC # BLD: 0 /100 WBCS — SIGNIFICANT CHANGE UP (ref 0–0)
PH UR: 6 — SIGNIFICANT CHANGE UP (ref 5–8)
PLATELET # BLD AUTO: 139 K/UL — SIGNIFICANT CHANGE UP (ref 130–400)
PROT UR-MCNC: NEGATIVE — SIGNIFICANT CHANGE UP
RAPID RVP RESULT: DETECTED
RBC # BLD: 3.71 M/UL — LOW (ref 4.2–5.4)
RBC # FLD: 14.8 % — HIGH (ref 11.5–14.5)
RBC CASTS # UR COMP ASSIST: 2 /HPF — SIGNIFICANT CHANGE UP (ref 0–4)
SARS-COV-2 RNA SPEC QL NAA+PROBE: DETECTED
SP GR SPEC: 1.01 — SIGNIFICANT CHANGE UP (ref 1.01–1.03)
UROBILINOGEN FLD QL: SIGNIFICANT CHANGE UP
WBC # BLD: 5.95 K/UL — SIGNIFICANT CHANGE UP (ref 4.8–10.8)
WBC # FLD AUTO: 5.95 K/UL — SIGNIFICANT CHANGE UP (ref 4.8–10.8)
WBC UR QL: 2 /HPF — SIGNIFICANT CHANGE UP (ref 0–5)

## 2021-12-18 PROCEDURE — 71045 X-RAY EXAM CHEST 1 VIEW: CPT | Mod: 26

## 2021-12-18 RX ADMIN — Medication 10 MILLIGRAM(S): at 00:01

## 2021-12-18 RX ADMIN — SODIUM CHLORIDE 1000 MILLILITER(S): 9 INJECTION, SOLUTION INTRAVENOUS at 00:00

## 2021-12-18 NOTE — ED PROVIDER NOTE - PHYSICAL EXAMINATION
CONSTITUTIONAL: Well-appearing; well-nourished; in no apparent distress.   CARDIOVASCULAR: Normal S1, S2; no murmurs, rubs, or gallops.   RESPIRATORY: Normal chest excursion with respiration; breath sounds clear and equal bilaterally; no wheezes, rhonchi, or rales.  GI/: non-tender; no palpable organomegaly.   SKIN: Normal for age and race; warm; dry; good turgor; no apparent lesions or exudate.   NEURO/PSYCH: A & O x 4; grossly unremarkable. mood and manner are appropriate.

## 2021-12-18 NOTE — ED PROVIDER NOTE - OBJECTIVE STATEMENT
pt approx 19 weeks pregnant with pmxh fever, unvaccinated and covid19 exposure from boyfriend, presents with fever, cough. Denies chill/HA/dizziness/chest pain/palpitation/sob/abd pain/n/v/d/ black stool/bloody stool/urinary sxs/vaginal bleeding

## 2021-12-18 NOTE — ED PROVIDER NOTE - NSFOLLOWUPINSTRUCTIONS_ED_ALL_ED_FT
You have tested POSITIVE for the novel coronavirus (COVID-19). Upon discharge, you must self-quarantine for 14 days, or until the Department of Health contacts you. Please wear a face mask if you are around other individuals. Try to avoid contact with house members, family, and friends for the duration of this quarantine. Please follow up with your primary care physician within 2-3 weeks of your discharge from [facility]. Please take all medications as prescribed. If you experience any worsening or recurrence of your symptoms, particularly worsening or high fever, shortness of breathe, extreme fatigue, or bloody cough please call 9-1-1 immediately or report to the nearest Emergency Department. If you have any questions or concerns, please do not hesitate to call the hospital at [phone].

## 2021-12-18 NOTE — ED PROVIDER NOTE - NSFOLLOWUPCLINICS_GEN_ALL_ED_FT
[facility] [place] [place] [address]  Phone: [phone]  Fax:

## 2021-12-18 NOTE — ED PROVIDER NOTE - PATIENT PORTAL LINK FT
You can access the FollowMyHealth Patient Portal offered by [facility] by registering at the following website: [email or web address]. By joining [email or web address]’s FollowMyHealth portal, you will also be able to view your health information using other applications (apps) compatible with our system.

## 2021-12-18 NOTE — ED PROVIDER NOTE - ATTENDING CONTRIBUTION TO CARE
32 yo f  19wks pregnant. pt presents for eval of fever for 2 days w/ T max 102F. + itchy throat, cough. no cp/sob. boyfriend tested covid positive recently. + nausea. pt vomited once after drinking a lot of water quickly. no abd pain, vaginal bleeding or loss of fluid. normal fetal movements.   pt not vaccinated     vss  gen- NAD, aaox3  HENT- oropharynx clear  card-rrr  lungs-ctab, no wheezing or rhonchi  abd-sntnd, no guarding or rebound, + gravid uterus  neuro- full str/sensation, cn ii-xii grossly intact, normal coordination     well appearing, likely early covid, no resp sx, pt well appearing  will get screening labs, xr, covid test, fluids  likely dc

## 2021-12-18 NOTE — ED PROVIDER NOTE - CLINICAL SUMMARY MEDICAL DECISION MAKING FREE TEXT BOX
pt observed in ER several hours. pt well appearing, resting comfortably w/o resp distress. labs unactionable. pt tolerating PO. pt stable for dc w/ return precautions

## 2021-12-23 ENCOUNTER — APPOINTMENT (OUTPATIENT)
Dept: ANTEPARTUM | Facility: CLINIC | Age: 31
End: 2021-12-23

## 2021-12-23 ENCOUNTER — OUTPATIENT (OUTPATIENT)
Dept: OUTPATIENT SERVICES | Facility: HOSPITAL | Age: 31
LOS: 1 days | Discharge: HOME | End: 2021-12-23

## 2021-12-23 VITALS — OXYGEN SATURATION: 98 % | HEART RATE: 94 BPM

## 2021-12-23 VITALS — TEMPERATURE: 99 F

## 2021-12-23 DIAGNOSIS — Z98.890 OTHER SPECIFIED POSTPROCEDURAL STATES: Chronic | ICD-10-CM

## 2021-12-23 LAB
APPEARANCE UR: CLEAR — SIGNIFICANT CHANGE UP
BILIRUB UR-MCNC: NEGATIVE — SIGNIFICANT CHANGE UP
COLOR SPEC: YELLOW — SIGNIFICANT CHANGE UP
DIFF PNL FLD: NEGATIVE — SIGNIFICANT CHANGE UP
GLUCOSE UR QL: NEGATIVE — SIGNIFICANT CHANGE UP
KETONES UR-MCNC: NEGATIVE — SIGNIFICANT CHANGE UP
LEUKOCYTE ESTERASE UR-ACNC: NEGATIVE — SIGNIFICANT CHANGE UP
NITRITE UR-MCNC: NEGATIVE — SIGNIFICANT CHANGE UP
PH UR: 6 — SIGNIFICANT CHANGE UP (ref 5–8)
PROT UR-MCNC: SIGNIFICANT CHANGE UP
SP GR SPEC: 1.02 — SIGNIFICANT CHANGE UP (ref 1.01–1.03)
UROBILINOGEN FLD QL: SIGNIFICANT CHANGE UP

## 2021-12-23 RX ORDER — ACETAMINOPHEN 500 MG
650 TABLET ORAL ONCE
Refills: 0 | Status: COMPLETED | OUTPATIENT
Start: 2021-12-23 | End: 2021-12-23

## 2021-12-23 RX ADMIN — Medication 650 MILLIGRAM(S): at 14:15

## 2021-12-23 RX ADMIN — Medication 650 MILLIGRAM(S): at 14:27

## 2021-12-23 NOTE — OB PROVIDER TRIAGE NOTE - NSHPPHYSICALEXAM_GEN_ALL_CORE
Vital Signs Last 24 Hrs  T(C): 37.0 (23 Dec 2021 12:58), Max: 37.0 (23 Dec 2021 12:58)  T(F): 98.6 (23 Dec 2021 12:58), Max: 98.6 (23 Dec 2021 12:58)  HR: 103 (23 Dec 2021 12:59) (103 - 103)  BP: 110/70 (23 Dec 2021 12:59) (110/70 - 110/70) T(C): 37 (12-23-21 @ 13:02), Max: 37.0 (12-23-21 @ 12:58)  T(F): 98.6 (12-23-21 @ 13:02), Max: 98.6 (12-23-21 @ 12:58)  HR: 103 (12-23-21 @ 13:02) (103 - 103)  BP: 110/71 (12-23-21 @ 13:02) (110/70 - 110/71)  RR: 18 (12-23-21 @ 13:02) (18 - 18)    Gen: AOx3, NAD  Abd: soft, gravid, nontender, no palpable contractions  Ext: no swelling  Speculum: minimal vaginal discharge, no vaginal bleeding, cervix appeared closed  Bedside sono: CL 3.4cm with 1cm of funneling, low lying placenta/previa noted, cephalic, MVP 5.15cm, FHR 148bpm  [place]: no contractions  SVE: deferred

## 2021-12-23 NOTE — OB RN TRIAGE NOTE - FALL HARM RISK - UNIVERSAL INTERVENTIONS
Bed in lowest position, wheels locked, appropriate side rails in place/Call bell, personal items and telephone in reach/Instruct patient to call for assistance before getting out of bed or chair/Non-slip footwear when patient is out of bed/[place] to call system/Physically safe environment - no spills, clutter or unnecessary equipment/Purposeful Proactive Rounding/Room/bathroom lighting operational, light cord in reach

## 2021-12-23 NOTE — OB PROVIDER TRIAGE NOTE - NS_OBGYNHISTORY_OBGYN_ALL_OB_FT
Demetrio patient  She is currently on prednisone, blood glucose level tredning much higher in the afternoon evening and low overnight, typically in the 60s all night unless she eats before bed.  Should she change the dose?  Or move the timing of the dose?  Both? Please advise   Currently  on prednisone 40 mg daily: NPH 20 units taking in the AM  -Taking some aspart with meals, uses scale if blood glucose above 180 mg/dL (last night took 1 unit, and ate crackers pb, stayed around 80-90 mg/dL all night)    OB Hx:    20w IUFD, PPROM, vaginal delivery  etop x1, no D&C  SABx1, no D&C    Gyn Hx:  Denies h/o ovarian cysts, fibroids, STIs, or abnormal pap smears.

## 2021-12-23 NOTE — OB PROVIDER TRIAGE NOTE - NSOBPROVIDERNOTE_OBGYN_ALL_OB_FT
30yo  @19w3d, DIANNA 22, 32yo  @19w3d, DIANNA 22, mildly symptomatic COVID19, shortened cervix with funneling, low suspicion for  labor, reassuring maternal and fetal status    - f/u vaginitis  - f/u UA, Ucx  - tylenol 650mg now  -  labor precautions  - encouraged PO hydration, quarantine  - f/u with  at next appointment     and  aware

## 2021-12-23 NOTE — OB PROVIDER TRIAGE NOTE - HISTORY OF PRESENT ILLNESS
32yo  @19w3d, DIANNA 22, dated by first trimester sono, presented for     Recently tested positive for COVID19 on .     h/o 20wk IUFD after PPROM, vaginal delivery    on ASA 81mg (last dose @###)  on lovenox prophylactic dose (last dose @###) 32yo  @19w3d, DIANNA 22, dated by first trimester sono, presented after feeling abdominal tightening and cramping since this AM. Cramping initially began as a 4 out of 10 in intensity, and currently is a 2 out of 10 in intensity. Denies vaginal bleeding, leakage of fluid. Reports feeling fetal movement. Reports 1 week of wet vaginal discharge, denies malodor. Recently tested positive for COVID19 on . Currently has nasal congestion, denies fever, SOB, cough, or diarrhea. Was initially scheduled for cerclage today due to recent sonogram  showing cervical length of 2.5cm and h/o 20wk IUFD after PPROM last year. Patient is currently on ASA 81mg (last dose this AM), on lovenox prophylactic dose (last dose last night). Reports h/o possible antiphospholipid antibodies as her results were "borderline". Denies dysuria, hematuria, or increased urgency or frequency. Last meal was this AM, last BM was a couple of days ago (constipation), last sexual intercourse was several weeks ago. GBS uknown.

## 2021-12-24 LAB
CULTURE RESULTS: SIGNIFICANT CHANGE UP
SPECIMEN SOURCE: SIGNIFICANT CHANGE UP

## 2021-12-26 ENCOUNTER — TRANSCRIPTION ENCOUNTER (OUTPATIENT)
Age: 31
End: 2021-12-26

## 2021-12-27 ENCOUNTER — OUTPATIENT (OUTPATIENT)
Dept: OUTPATIENT SERVICES | Facility: HOSPITAL | Age: 31
LOS: 1 days | Discharge: HOME | End: 2021-12-27

## 2021-12-27 DIAGNOSIS — Z98.890 OTHER SPECIFIED POSTPROCEDURAL STATES: Chronic | ICD-10-CM

## 2021-12-28 ENCOUNTER — APPOINTMENT (OUTPATIENT)
Dept: ANTEPARTUM | Facility: CLINIC | Age: 31
End: 2021-12-28

## 2021-12-28 VITALS — DIASTOLIC BLOOD PRESSURE: 56 MMHG | HEART RATE: 78 BPM | SYSTOLIC BLOOD PRESSURE: 104 MMHG

## 2021-12-28 LAB
A VAGINAE DNA VAG QL NAA+PROBE: SIGNIFICANT CHANGE UP
BVAB2 DNA VAG QL NAA+PROBE: SIGNIFICANT CHANGE UP
C ALBICANS DNA VAG QL NAA+PROBE: NEGATIVE — SIGNIFICANT CHANGE UP
C GLABRATA DNA VAG QL NAA+PROBE: NEGATIVE — SIGNIFICANT CHANGE UP
C KRUSEI DNA VAG QL NAA+PROBE: NEGATIVE — SIGNIFICANT CHANGE UP
C LUSITANIAE DNA VAG QL NAA+PROBE: NEGATIVE — SIGNIFICANT CHANGE UP
C TRACH RRNA SPEC QL NAA+PROBE: NEGATIVE — SIGNIFICANT CHANGE UP
MEGA1 DNA VAG QL NAA+PROBE: SIGNIFICANT CHANGE UP
N GONORRHOEA RRNA SPEC QL NAA+PROBE: NEGATIVE — SIGNIFICANT CHANGE UP
T VAGINALIS RRNA SPEC QL NAA+PROBE: NEGATIVE — SIGNIFICANT CHANGE UP

## 2021-12-28 RX ORDER — DOCUSATE SODIUM 100 MG
1 CAPSULE ORAL
Qty: 30 | Refills: 3
Start: 2021-12-28 | End: 2022-04-26

## 2021-12-28 NOTE — OB RN TRIAGE NOTE - FALL HARM RISK - UNIVERSAL INTERVENTIONS
Bed in lowest position, wheels locked, appropriate side rails in place/Call bell, personal items and telephone in reach/Instruct patient to call for assistance before getting out of bed or chair/Non-slip footwear when patient is out of bed/[place] to call system/Physically safe environment - no spills, clutter or unnecessary equipment/Purposeful Proactive Rounding/Room/bathroom lighting operational, light cord in reach

## 2021-12-28 NOTE — OB PROVIDER TRIAGE NOTE - NSOBPROVIDERNOTE_OBGYN_ALL_OB_FT
32yo  @20w4d, with constipation, reassuring maternal and fetal status    -  labor precautions  - encouraged PO hydration  - take miralax every other day, colace every day  - f/u with Dr. Vitale for cerclage placement     Dr Deshpande and Dr. Champagne aware

## 2021-12-28 NOTE — OB PROVIDER TRIAGE NOTE - HISTORY OF PRESENT ILLNESS
30yo  @20w4d, DIANNA 22 dated by first trimester sono, presents of ongoing abdominal discomfort accompanied by cramping and bloating. Patient reports constipation throughout this pregnancy and has tried a combination of miralax and docusate for it. She currently takes docusate BID and miralax weekly. Her last bowel movement was at 2330 which she described as "hard and lumpy". Denies vaginal bleeding, leakage of fluid. Reports feeling fetal movement. Reports 1 week of wet vaginal discharge, denies malodor. Recently tested positive for COVID19 on . Currently has nasal congestion, denies fever, SOB, cough, or diarrhea. Was initially scheduled for cerclage today due to recent sonogram  showing cervical length of 2.5cm and h/o 20wk IUFD after PPROM last year. Patient is currently on ASA 81mg (last dose this AM), on lovenox prophylactic dose (last dose last night). Reports h/o possible antiphospholipid antibodies as her results were "borderline". Denies dysuria, hematuria, or increased urgency or frequency. Last meal was this AM, last BM was a couple of days ago (constipation), last sexual intercourse was several weeks ago. GBS uknown.  32yo  @20w4d, DIANNA 22 dated by first trimester sono, presents of ongoing abdominal discomfort accompanied by cramping and bloating. Patient reports constipation throughout this pregnancy and has tried a combination of miralax and docusate for it. She currently takes docusate BID and miralax weekly. Her last bowel movement was at 2330 which she described as "hard and lumpy". She denies pain with her bowel movements, but reports noticing blood streaked in her stool periodically. Her last PO intake was at 2100. Her last intercourse was several months ago. Pregnancy is complete placenta previa. Reports she noticed some spotting when she wiped last night. Denies vaginal bleeding, leakage of fluid. Reports feeling fetal movement. Patient is COVID positive and left her quarantine to come to the hospital. Patient is scheduled for cerclage on  after sonogram on  showing cervical length of 2.5cm and h/o 20wk IUFD after PPROM last year. Patient is currently on ASA 81mg (last dose this AM), on lovenox prophylactic dose (last dose at 1030). Reports h/o possible antiphospholipid antibodies as her results were "borderline". Denies dysuria, hematuria, or increased urgency or frequency. Denies chest pain, SOB, RUQ/epigastric pain, N/V, fevers, chills, LE pain/swelling. GBS unknown.

## 2021-12-28 NOTE — OB PROVIDER TRIAGE NOTE - NS_OBGYNHISTORY_OBGYN_ALL_OB_FT
OB Hx:    20w IUFD, PPROM, vaginal delivery  etop x1, no D&C  SABx1, no D&C    Gyn Hx:  Denies h/o ovarian cysts, fibroids, STIs, or abnormal pap smears.

## 2021-12-28 NOTE — OB RN TRIAGE NOTE - IN THE PAST 12 MONTHS HAVE YOU USED DRUGS OTHER THAN THOSE REQUIRED FOR MEDICAL REASON?
01/09/17 1300   Groups   Details [facility] [employer]   Joined after an assessment.  Engaged.  Positive Role Model   No

## 2021-12-28 NOTE — OB PROVIDER TRIAGE NOTE - ADDITIONAL INSTRUCTIONS
Please increase fiber in your diet and drink at least 8 cups of water daily. Take miralax every other day. Take colace daily. Follow up with Dr Chauhan for cerclage placement on 12/30. Please come back if you have vaginal bleeding, leakage of fluid or decreased fetal movement.

## 2021-12-28 NOTE — OB PROVIDER TRIAGE NOTE - NSHPPHYSICALEXAM_GEN_ALL_CORE
T(C): 37 (12-23-21 @ 13:02), Max: 37.0 (12-23-21 @ 12:58)  T(F): 98.6 (12-23-21 @ 13:02), Max: 98.6 (12-23-21 @ 12:58)  HR: 103 (12-23-21 @ 13:02) (103 - 103)  BP: 110/71 (12-23-21 @ 13:02) (110/70 - 110/71)  RR: 18 (12-23-21 @ 13:02) (18 - 18)    Gen: AOx3, NAD  Abd: soft, gravid, nontender, no palpable contractions  Ext: no swelling  Speculum: minimal vaginal discharge, no vaginal bleeding, cervix appeared closed  Bedside sono: CL 3.4cm with 1cm of funneling, low lying placenta/previa noted, cephalic, MVP 5.15cm, FHR 148bpm  [place]: no contractions  SVE: deferred Physical exam:    Vital Signs Last 24 Hrs  T(F): 98.4 (28 Dec 2021 00:15), Max: 98.4 (28 Dec 2021 00:15)  HR: 85 (28 Dec 2021 00:15) (78 - 85)  BP: 100/55 (28 Dec 2021 00:15) (100/55 - 104/56)  RR: 16 (28 Dec 2021 00:15) (16 - 16)    Gen: NAD  Abdomen: soft, gravid, nontender, with nonpalpable contractions  Speculum: cervix appears closed, no pooling/blood  BSS: vertex, placenta previa, MVP 5.45cm,

## 2021-12-28 NOTE — OB RN TRIAGE NOTE - CHIEF COMPLAINT QUOTE
complaining of cramping due to constipation , patient is on docusate and miralax. patient saw some blood, patient also states she has a shortened cervix supposed to have cerclage placement on thursday. patient also told she has placenta previa

## 2021-12-29 ENCOUNTER — APPOINTMENT (OUTPATIENT)
Dept: ANTEPARTUM | Facility: CLINIC | Age: 31
End: 2021-12-29

## 2021-12-30 ENCOUNTER — OUTPATIENT (OUTPATIENT)
Dept: OUTPATIENT SERVICES | Facility: HOSPITAL | Age: 31
LOS: 1 days | Discharge: HOME | End: 2021-12-30

## 2021-12-30 VITALS — SYSTOLIC BLOOD PRESSURE: 99 MMHG | HEART RATE: 88 BPM | DIASTOLIC BLOOD PRESSURE: 55 MMHG

## 2021-12-30 VITALS — SYSTOLIC BLOOD PRESSURE: 102 MMHG | HEART RATE: 93 BPM | DIASTOLIC BLOOD PRESSURE: 57 MMHG

## 2021-12-30 VITALS
DIASTOLIC BLOOD PRESSURE: 59 MMHG | TEMPERATURE: 99 F | HEART RATE: 78 BPM | RESPIRATION RATE: 18 BRPM | SYSTOLIC BLOOD PRESSURE: 95 MMHG

## 2021-12-30 DIAGNOSIS — Z98.890 OTHER SPECIFIED POSTPROCEDURAL STATES: Chronic | ICD-10-CM

## 2021-12-30 LAB
BLD GP AB SCN SERPL QL: SIGNIFICANT CHANGE UP
HCT VFR BLD CALC: 32.3 % — LOW (ref 37–47)
HGB BLD-MCNC: 10.8 G/DL — LOW (ref 12–16)
MCHC RBC-ENTMCNC: 30.1 PG — SIGNIFICANT CHANGE UP (ref 27–31)
MCHC RBC-ENTMCNC: 33.4 G/DL — SIGNIFICANT CHANGE UP (ref 32–37)
MCV RBC AUTO: 90 FL — SIGNIFICANT CHANGE UP (ref 81–99)
NRBC # BLD: 0 /100 WBCS — SIGNIFICANT CHANGE UP (ref 0–0)
PLATELET # BLD AUTO: 219 K/UL — SIGNIFICANT CHANGE UP (ref 130–400)
RBC # BLD: 3.59 M/UL — LOW (ref 4.2–5.4)
RBC # FLD: 14.3 % — SIGNIFICANT CHANGE UP (ref 11.5–14.5)
WBC # BLD: 7.73 K/UL — SIGNIFICANT CHANGE UP (ref 4.8–10.8)
WBC # FLD AUTO: 7.73 K/UL — SIGNIFICANT CHANGE UP (ref 4.8–10.8)

## 2021-12-30 RX ORDER — INDOMETHACIN 50 MG
50 CAPSULE ORAL ONCE
Refills: 0 | Status: COMPLETED | OUTPATIENT
Start: 2021-12-30 | End: 2021-12-30

## 2021-12-30 RX ORDER — INDOMETHACIN 50 MG
1 CAPSULE ORAL
Qty: 4 | Refills: 0
Start: 2021-12-30

## 2021-12-30 RX ADMIN — Medication 50 MILLIGRAM(S): at 09:19

## 2021-12-30 NOTE — OB PROVIDER TRIAGE NOTE - NSLASTDATEVISIT_OBGYN_ALL_OB
Personalized Preventive Plan for Catalino Pier - 3/11/2021  Medicare offers a range of preventive health benefits. Some of the tests and screenings are paid in full while other may be subject to a deductible, co-insurance, and/or copay. Some of these benefits include a comprehensive review of your medical history including lifestyle, illnesses that may run in your family, and various assessments and screenings as appropriate. After reviewing your medical record and screening and assessments performed today your provider may have ordered immunizations, labs, imaging, and/or referrals for you. A list of these orders (if applicable) as well as your Preventive Care list are included within your After Visit Summary for your review. Other Preventive Recommendations:    · A preventive eye exam performed by an eye specialist is recommended every 1-2 years to screen for glaucoma; cataracts, macular degeneration, and other eye disorders. · A preventive dental visit is recommended every 6 months. · Try to get at least 150 minutes of exercise per week or 10,000 steps per day on a pedometer . · Order or download the FREE \"Exercise & Physical Activity: Your Everyday Guide\" from [employer]. Call 3-745.193.8985 or search [employer] online. · You need 2303-0070 mg of calcium and 2288-7714 IU of vitamin D per day. It is possible to meet your calcium requirement with diet alone, but a vitamin D supplement is usually necessary to meet this goal.  · When exposed to the sun, use a sunscreen that protects against both UVA and UVB radiation with an SPF of 30 or greater. Reapply every 2 to 3 hours or after sweating, drying off with a towel, or swimming. · Always wear a seat belt when traveling in a car. Always wear a helmet when riding a bicycle or motorcycle.
Unknown

## 2021-12-30 NOTE — OB RN TRIAGE NOTE - FALL HARM RISK - UNIVERSAL INTERVENTIONS
Bed in lowest position, wheels locked, appropriate side rails in place/Call bell, personal items and telephone in reach/Instruct patient to call for assistance before getting out of bed or chair/Non-slip footwear when patient is out of bed/[place] to call system/Physically safe environment - no spills, clutter or unnecessary equipment/Purposeful Proactive Rounding/Room/bathroom lighting operational, light cord in reach

## 2021-12-30 NOTE — OB RN INTRAOPERATIVE NOTE - NSOBSELHIDDEN_OBGYN_ALL_OB_FT
[NSOBAttendingProcedure1_OBGYN_ALL_OB_FT:[id number]],[NSRNCirculatorProcedure1_OBGYN_ALL_OB_FT:[id number]]

## 2021-12-30 NOTE — OB PROVIDER TRIAGE NOTE - NSHPPHYSICALEXAM_GEN_ALL_CORE
T(C): 37 (12-30-21 @ 07:53), Max: 37 (12-30-21 @ 07:14)  HR: 78 (12-30-21 @ 07:53) (78 - 88)  BP: 95/59 (12-30-21 @ 07:53) (95/59 - 99/55)  RR: 18 (12-30-21 @ 07:53) (18 - 18)    Abd: gravid, soft, NT  VE deferred

## 2021-12-30 NOTE — BRIEF OPERATIVE NOTE - NSICDXBRIEFPROCEDURE_GEN_ALL_CORE_FT
PROCEDURES:  Anaya cerclage of cervix during pregnancy by vaginal approach 30-Dec-2021 08:46:54  Kanwal Veliz

## 2021-12-30 NOTE — OB PROVIDER TRIAGE NOTE - HISTORY OF PRESENT ILLNESS
Pt is a 31y.o.  @ 20.6wks presents for cerclage placement. Pt has h/o SAB @ 20wks s/p PPROM. Pt also states she has had serial cervical lengths this pregnancy which show cervical shortening, last @ 18.1wks was 2.5cm. Pt denies LOF, VB or ctx and reports +FM.     Pt tested positive for Covid-19 on 2021. Pt denies symptoms today.

## 2021-12-30 NOTE — CHART NOTE - NSCHARTNOTEFT_GEN_A_CORE
PACU ANESTHESIA ADMISSION NOTE  ____  Intubated  TV:______       Rate: ______      FiO2: ______  x____  Patent Airway  ____  Full return of protective reflexes  ____  Full recovery from anesthesia / back to baseline   Mental Status:    x____ Awake    ____ Alert     ____ Drowsy    ____ Sedated  Nausea/Vomiting:   x____ NO    ____ Yes,   See Post - Op Orders        Pain Scale (0-10):    ____ Treatment:   _x___ None      ____ See Post - Op/PCA Orders  Post - Operative Fluids:    x____ Oral     ____ See Post - Op Orders  Plan: Discharge:    x ____Home         _____Floor       _____Critical Care      _____  Other:_________________    Comments:

## 2021-12-30 NOTE — OB PROVIDER TRIAGE NOTE - NS_OBGYNHISTORY_OBGYN_ALL_OB_FT
2015 etop w/ D&C   PPROM @ 20wks s/p    Complete AB @ 6 wks    Denies STI, PID, abnl PAP, fibroids, cysts

## 2021-12-30 NOTE — OB PROVIDER TRIAGE NOTE - NSOBPROVIDERNOTE_OBGYN_ALL_OB_FT
31y.o.  @ 20.6wk for cerclage secondary to shortened cervix and h/o PPROM @ 20wks, on Vaginal progesterone, h/o antiphospholipid syndrome on Lovenox and ASA  - CBC. T&S sent  - On call to OR  Dr. Vitale aware 31y.o.  @ 20.6wk for cerclage secondary to shortened cervix and h/o PPROM @ 20wks, on Vaginal progesterone, h/o antiphospholipid syndrome on Lovenox and ASA  - CBC. T&S sent  - On call to OR  Dr. Vitale aware    ADDENDUM: Patient is s/p uncomplicated cervical cerclage. Postop FH , voided in PACU. Instructed to take indomethacin 25mg q8h  for 1-2d postop. Can continue lovenox in 12 hours, start vaginal progesterone in 2 days. If you have worsening abdominal pain, heavy vaginal bleeding, or fever, call your doctor or return to Labor and Delivery. Follow up with Dr. Chauhan in 2 weeks 31y.o.  @ 20.6wk for cerclage secondary to shortened cervix and h/o PPROM @ 20wks, on Vaginal progesterone, h/o antiphospholipid syndrome on Lovenox and ASA  - CBC. T&S sent  - On call to OR  Dr. Vitale aware    ADDENDUM: Patient is s/p uncomplicated cervical cerclage. Postop FH 143bpm, voided in PACU. Instructed to take indomethacin 25mg q8h  for 1-2d postop. Can continue lovenox in 12 hours, start vaginal progesterone in 2 days. If you have worsening abdominal pain, heavy vaginal bleeding, or fever, call your doctor or return to Labor and Delivery. Follow up with Dr. Chauhan in 2 weeks

## 2021-12-30 NOTE — OB PROVIDER TRIAGE NOTE - ADDITIONAL INSTRUCTIONS
Instructed to take indomethacin 25mg q8h  for 1-2d postop. Can continue lovenox in 12 hours, start vaginal progesterone in 2 days. If you have worsening abdominal pain, heavy vaginal bleeding, or fever, call your doctor or return to Labor and Delivery. Follow up with Dr. Chauhan in 2 weeks

## 2021-12-30 NOTE — BRIEF OPERATIVE NOTE - NSICDXBRIEFPOSTOP_GEN_ALL_CORE_FT
POST-OP DIAGNOSIS:  Short cervix with cervical cerclage, antepartum, second trimester 30-Dec-2021 08:47:51  Kanwal Veliz

## 2022-01-04 ENCOUNTER — OUTPATIENT (OUTPATIENT)
Dept: OUTPATIENT SERVICES | Facility: HOSPITAL | Age: 32
LOS: 1 days | Discharge: HOME | End: 2022-01-04

## 2022-01-04 ENCOUNTER — APPOINTMENT (OUTPATIENT)
Dept: ANTEPARTUM | Facility: CLINIC | Age: 32
End: 2022-01-04
Payer: COMMERCIAL

## 2022-01-04 ENCOUNTER — ASOB RESULT (OUTPATIENT)
Age: 32
End: 2022-01-04

## 2022-01-04 DIAGNOSIS — Z98.890 OTHER SPECIFIED POSTPROCEDURAL STATES: Chronic | ICD-10-CM

## 2022-01-04 PROBLEM — D68.61 ANTIPHOSPHOLIPID SYNDROME: Chronic | Status: ACTIVE | Noted: 2021-12-30

## 2022-01-04 PROCEDURE — 76817 TRANSVAGINAL US OBSTETRIC: CPT | Mod: 26

## 2022-01-04 PROCEDURE — 76805 OB US >/= 14 WKS SNGL FETUS: CPT | Mod: 26,59

## 2022-01-05 DIAGNOSIS — O09.212 SUPERVISION OF PREGNANCY WITH HISTORY OF PRE-TERM LABOR, SECOND TRIMESTER: ICD-10-CM

## 2022-01-05 DIAGNOSIS — O34.30 MATERNAL CARE FOR CERVICAL INCOMPETENCE, UNSPECIFIED TRIMESTER: ICD-10-CM

## 2022-01-05 DIAGNOSIS — O44.40 LOW LYING PLACENTA NOS OR WITHOUT HEMORRHAGE, UNSPECIFIED TRIMESTER: ICD-10-CM

## 2022-01-05 DIAGNOSIS — Z3A.21 21 WEEKS GESTATION OF PREGNANCY: ICD-10-CM

## 2022-01-08 ENCOUNTER — OUTPATIENT (OUTPATIENT)
Dept: OUTPATIENT SERVICES | Facility: HOSPITAL | Age: 32
LOS: 1 days | Discharge: HOME | End: 2022-01-08

## 2022-01-08 VITALS
HEART RATE: 88 BPM | RESPIRATION RATE: 16 BRPM | TEMPERATURE: 98 F | DIASTOLIC BLOOD PRESSURE: 63 MMHG | SYSTOLIC BLOOD PRESSURE: 106 MMHG

## 2022-01-08 VITALS — HEART RATE: 79 BPM | SYSTOLIC BLOOD PRESSURE: 108 MMHG | DIASTOLIC BLOOD PRESSURE: 62 MMHG

## 2022-01-08 DIAGNOSIS — Z98.890 OTHER SPECIFIED POSTPROCEDURAL STATES: Chronic | ICD-10-CM

## 2022-01-08 DIAGNOSIS — O26.892 OTHER SPECIFIED PREGNANCY RELATED CONDITIONS, SECOND TRIMESTER: ICD-10-CM

## 2022-01-08 DIAGNOSIS — R11.2 NAUSEA WITH VOMITING, UNSPECIFIED: ICD-10-CM

## 2022-01-08 NOTE — OB PROVIDER TRIAGE NOTE - HISTORY OF PRESENT ILLNESS
32yo  @22w1d DIANNA: 22 by first trim george presents to L&D with episode of sharp abdominal pain lasting 1-2 min earlier this afternoon. Pain was acute in onset and spontaneously resolved. Currently states she has mild discomfort 3/10. Patient reports she has had intermittent lower abdominal discomfort and pressure for past few days. Has not attempted tylenol or warm pack for pain relief. Also states that she has had diarrhea and worsening nausea for the past week. Was seen by Dr. Wilson on Thursday, reports that she was examined and cerclage was found to be in place. Stool sample was sent, per patient sample was negative for infection. Denies fever, chills, vomiting, dysuria. Denies contractions, vaginal bleeding, leakage of fluid. Feeling fetal movement. She states that she has been recommended to remain on low activity and majority bedrest and she has been compliant. Pregnancy complicated by shortened cervix, had cerclage placed on 21. H/o PPROM with  @20w in prior pregnancy. She has anti-phospholipid syndrome and is currently on lovenox and ASA. Denies other complications in this pregnancy.    Last ate: earlier today  Last BM: this AM, loose stool, no blood

## 2022-01-08 NOTE — OB PROVIDER TRIAGE NOTE - NSHPPHYSICALEXAM_GEN_ALL_CORE
Vital Signs Last 24 Hrs  T(C): 36.9 (08 Jan 2022 19:49), Max: 36.9 (08 Jan 2022 19:49)  T(F): 98.4 (08 Jan 2022 19:49), Max: 98.4 (08 Jan 2022 19:49)  HR: 88 (08 Jan 2022 19:49) (88 - 88)  BP: 106/63 (08 Jan 2022 19:49) (106/63 - 106/63)  RR: 16 (08 Jan 2022 19:49) (16 - 16)    Gen: AOx3, NAD  Cardiac: RRR, S1S2, no m/r/g  Pulm: CTA b/l  Abd: soft, gravid, nontender, no palpable ctx  CVA: nontender b/l  Speculum: no bleeding, no abnormal discharge noted, cerclage in place with stitch in 12 oclock position, cervix appears closed  BSS: varying presentation, MVP 6.01cm, FHR 152bpm, posterior placenta  [place]: no contractions

## 2022-01-08 NOTE — OB PROVIDER TRIAGE NOTE - NSHPLABSRESULTS_GEN_ALL_CORE
sonograms:  11/30 posterior complete previa, , CL 3.8cm  12/14 SIUP, CL 2.5cm  21w1d: normal anatomy, posterior low-lying placenta, 472g, MVP 5.39cm, cerclage noted

## 2022-01-08 NOTE — OB PROVIDER TRIAGE NOTE - NSOBPROVIDERNOTE_OBGYN_ALL_OB_FT
30yo  @22w1d, with abdominal pain now resolved, cerclage in place, h/o antiphospholipid syndrome, on lovenox and ASA, with reassuring maternal and fetal status.    -Hydration encouraged  -pelvic rest advised  -tylenol prn for pain control  -bleeding precautions,  labor precautions given  -f/u with MFM appt on Tuesday    Dr. Wilson and Dr. Hobbs aware

## 2022-01-08 NOTE — OB RN TRIAGE NOTE - FALL HARM RISK - UNIVERSAL INTERVENTIONS
Bed in lowest position, wheels locked, appropriate side rails in place/Call bell, personal items and telephone in reach/Instruct patient to call for assistance before getting out of bed or chair/Non-slip footwear when patient is out of bed/[place] to call system/Physically safe environment - no spills, clutter or unnecessary equipment/Purposeful Proactive Rounding/Room/bathroom lighting operational, light cord in reach

## 2022-01-08 NOTE — OB RN TRIAGE NOTE - NSDCBPNONINVSYSTOLIC_OBGYN_A_OB_NU
+DIARRHEA, +ABD PAIN, +HEADACHE, +FEVER  No chills, No photophobia/eye pain/changes in vision, No ear pain/sore throat/dysphagia, No chest pain/palpitations, no SOB/cough/wheeze/stridor, No N/V, no dysuria/frequency/discharge, No neck/back pain, no rash, no changes in neurological status/function.
108

## 2022-01-10 DIAGNOSIS — Z3A.20 20 WEEKS GESTATION OF PREGNANCY: ICD-10-CM

## 2022-01-10 DIAGNOSIS — O26.872 CERVICAL SHORTENING, SECOND TRIMESTER: ICD-10-CM

## 2022-01-10 DIAGNOSIS — O26.22 PREGNANCY CARE FOR PATIENT WITH RECURRENT PREGNANCY LOSS, SECOND TRIMESTER: ICD-10-CM

## 2022-01-11 ENCOUNTER — APPOINTMENT (OUTPATIENT)
Dept: ANTEPARTUM | Facility: CLINIC | Age: 32
End: 2022-01-11
Payer: COMMERCIAL

## 2022-01-11 ENCOUNTER — ASOB RESULT (OUTPATIENT)
Age: 32
End: 2022-01-11

## 2022-01-11 ENCOUNTER — OUTPATIENT (OUTPATIENT)
Dept: OUTPATIENT SERVICES | Facility: HOSPITAL | Age: 32
LOS: 1 days | Discharge: HOME | End: 2022-01-11

## 2022-01-11 DIAGNOSIS — Z98.890 OTHER SPECIFIED POSTPROCEDURAL STATES: Chronic | ICD-10-CM

## 2022-01-11 PROCEDURE — 76817 TRANSVAGINAL US OBSTETRIC: CPT | Mod: 26

## 2022-01-12 ENCOUNTER — OUTPATIENT (OUTPATIENT)
Dept: EMERGENCY DEPT | Facility: HOSPITAL | Age: 32
LOS: 1 days | Discharge: HOME | End: 2022-01-12
Payer: COMMERCIAL

## 2022-01-12 VITALS — SYSTOLIC BLOOD PRESSURE: 112 MMHG | HEART RATE: 90 BPM | DIASTOLIC BLOOD PRESSURE: 61 MMHG

## 2022-01-12 VITALS — SYSTOLIC BLOOD PRESSURE: 107 MMHG | HEART RATE: 104 BPM | DIASTOLIC BLOOD PRESSURE: 60 MMHG

## 2022-01-12 DIAGNOSIS — O09.299 SUPERVISION OF PREGNANCY WITH OTHER POOR REPRODUCTIVE OR OBSTETRIC HISTORY, UNSPECIFIED TRIMESTER: Chronic | ICD-10-CM

## 2022-01-12 DIAGNOSIS — Z98.890 OTHER SPECIFIED POSTPROCEDURAL STATES: Chronic | ICD-10-CM

## 2022-01-12 LAB
APPEARANCE UR: CLEAR — SIGNIFICANT CHANGE UP
BILIRUB UR-MCNC: NEGATIVE — SIGNIFICANT CHANGE UP
COLOR SPEC: SIGNIFICANT CHANGE UP
DIFF PNL FLD: NEGATIVE — SIGNIFICANT CHANGE UP
GLUCOSE UR QL: NEGATIVE — SIGNIFICANT CHANGE UP
KETONES UR-MCNC: NEGATIVE — SIGNIFICANT CHANGE UP
LEUKOCYTE ESTERASE UR-ACNC: NEGATIVE — SIGNIFICANT CHANGE UP
NITRITE UR-MCNC: NEGATIVE — SIGNIFICANT CHANGE UP
PH UR: 6.5 — SIGNIFICANT CHANGE UP (ref 5–8)
PROT UR-MCNC: NEGATIVE — SIGNIFICANT CHANGE UP
SP GR SPEC: 1.01 — SIGNIFICANT CHANGE UP (ref 1.01–1.03)
UROBILINOGEN FLD QL: SIGNIFICANT CHANGE UP

## 2022-01-12 PROCEDURE — L9996: CPT

## 2022-01-12 NOTE — OB PROVIDER TRIAGE NOTE - NSOBPROVIDERNOTE_OBGYN_ALL_OB_FT
32yo  @22w1d, with abdominal pain now resolved, cerclage in place, h/o antiphospholipid syndrome, on lovenox and ASA, with reassuring maternal and fetal status.    -Hydration encouraged  -pelvic rest advised  -tylenol prn for pain control  -bleeding precautions,  labor precautions given  -f/u with MFM appt on Tuesday    Dr. Wilson and Dr. Hobbs aware 30yo  @22w5d, with intermittent pelvic/vaginal pressure not painful, cerclage in place, h/o antiphospholipid syndrome, on lovenox and ASA, not in  labor, with reassuring maternal and fetal status.    -Hydration encouraged  -pelvic rest advised  -tylenol prn and warm showers for pain control  -bleeding precautions,  labor precautions given  -f/u with next appointment with Dr Vitale on   -f/u UA, UCx, Vaginitis    Dr. Wilson and Dr. Roldan aware

## 2022-01-12 NOTE — OB PROVIDER TRIAGE NOTE - ADDITIONAL INSTRUCTIONS
If you have contractions every few minutes, vaginal bleeding, leakage of fluid or you don't feel the baby move please call your doctor or come to labor and delivery.   Hydration encouraged.  Follow up at your next scheduled visits.

## 2022-01-12 NOTE — OB PROVIDER TRIAGE NOTE - NSICDXFAMILYHX_GEN_ALL_CORE_FT
FAMILY HISTORY:  No pertinent family history in first degree relatives     FAMILY HISTORY:  Father  Still living? Unknown  FH: hypertension, Age at diagnosis: Age Unknown  FHx: diabetes mellitus, Age at diagnosis: Age Unknown    Grandparent  Still living? Unknown  FH: colon cancer, Age at diagnosis: Age Unknown    Aunt  Still living? Unknown  Family history of breast cancer, Age at diagnosis: Age Unknown

## 2022-01-12 NOTE — OB PROVIDER TRIAGE NOTE - NSICDXPASTSURGICALHX_GEN_ALL_CORE_FT
PAST SURGICAL HISTORY:  History of D&C      PAST SURGICAL HISTORY:  History of cerclage, currently pregnant     History of D&C x1

## 2022-01-12 NOTE — OB PROVIDER TRIAGE NOTE - HISTORY OF PRESENT ILLNESS
32yo  @22w4d DIANNA: 22 by first trim george presents to L&D with pressure in vagina. States that it began two days ago, on and off but not painful, with nausea and diarrhea.  Pain was acute in onset and spontaneously resolved. Currently states she has mild discomfort 3/10. Patient reports she has had intermittent lower abdominal discomfort and pressure for past few days. Has not attempted tylenol or warm pack for pain relief. Also states that she has had diarrhea and worsening nausea for the past week. Was seen by Dr. Wilson on Thursday, reports that she was examined and cerclage was found to be in place. Stool sample was sent, per patient sample was negative for infection. Denies fever, chills, vomiting, dysuria. Denies contractions, vaginal bleeding, leakage of fluid. Feeling fetal movement. She states that she has been recommended to remain on low activity and majority bedrest and she has been compliant. Pregnancy complicated by shortened cervix, had cerclage placed on 21. H/o PPROM with  @20w in prior pregnancy. She has anti-phospholipid syndrome and is currently on lovenox and ASA. Denies other complications in this pregnancy.    Last ate: earlier today  Last BM: this AM, loose stool, no blood 30yo  @22w4d DIANNA: 22 by first trim george, s/p cerclage on , presents to L&D with pressure in vagina. States that it began two days ago, on and off but not painful, with nausea and diarrhea. Yesterday evening, she took a warm shower and tylenol which helped and allowed her to sleep, until 4AM when the pressure began again. Notes that it is slightly worse with movement and standing. Patient was evaluated in L&D triage on  for similar complaint, was not found to be in  labor and cerclage was well in place. Patient also reports that yesterday she saw the MFM, who noted that the cerclage was also well in place. Reports an episode of nausea and vomiting today, with softened stool for one week. Was seen by Dr. Wilson on Thursday, reports that she was examined and cerclage was found to be in place. Stool sample was sent, per patient sample was negative for infection. Denies CTX, LOF, VB. Reports good FM. Denies HA, CP, SOB, fevers, chills, urinary symptoms, or edema. Denies any recent sick contacts or URI symptoms. She states that she has been recommended to remain on low activity and majority bedrest and she has been compliant. Pregnancy complicated by shortened cervix, had cerclage placed on 21. H/o PPROM with  IUFD  @20w4d in prior pregnancy. She has anti-phospholipid syndrome and is currently on lovenox (last dose this AM) and ASA (last dose this AM). Uses vaginal progesterone, last used last night. Also taking docusate and famotidine PRN.     Last ate yesterday, last drank 10AM  Last BM: yesterday, soft stools, no blood  Last sexual intercourse: very early in pregnancy, has been on pelvic rest

## 2022-01-12 NOTE — OB PROVIDER TRIAGE NOTE - NS_OBGYNHISTORY_OBGYN_ALL_OB_FT
OBHx:    20w IUFD, PPROM, vaginal delivery  etop x1, no D&C  SABx1, no D&C    Gyn Hx:  Denies h/o ovarian cysts, fibroids, STIs, or abnormal pap smears. OBHx:   20w4d IUFD, PPROM, vaginal delivery  etop x1, with D&C  SABx1, no D&C    Gyn Hx: Denies h/o ovarian cysts, fibroids,  or abnormal pap smears. Reports abnormal pap smear s/p normal colposcopy in .

## 2022-01-12 NOTE — OB PROVIDER TRIAGE NOTE - ABORTIONS, OB PROFILE
I explained to the pt that her cough syrup cannot be refilled at this time due to it being too soon per report from the pharmacy team. All questions were answered.     Dr. Jena Oliver  [facility] [address].  Phone: [phone]  Fax: [phone]

## 2022-01-12 NOTE — OB RN TRIAGE NOTE - FALL HARM RISK - UNIVERSAL INTERVENTIONS
Bed in lowest position, wheels locked, appropriate side rails in place/Call bell, personal items and telephone in reach/Instruct patient to call for assistance before getting out of bed or chair/Non-slip footwear when patient is out of bed/[place] to call system/Physically safe environment - no spills, clutter or unnecessary equipment/Purposeful Proactive Rounding/Room/bathroom lighting operational, light cord in reach

## 2022-01-13 LAB
CULTURE RESULTS: SIGNIFICANT CHANGE UP
SPECIMEN SOURCE: SIGNIFICANT CHANGE UP

## 2022-01-17 ENCOUNTER — OUTPATIENT (OUTPATIENT)
Dept: OUTPATIENT SERVICES | Facility: HOSPITAL | Age: 32
LOS: 1 days | Discharge: HOME | End: 2022-01-17

## 2022-01-17 VITALS
DIASTOLIC BLOOD PRESSURE: 73 MMHG | HEART RATE: 97 BPM | TEMPERATURE: 99 F | SYSTOLIC BLOOD PRESSURE: 134 MMHG | RESPIRATION RATE: 16 BRPM

## 2022-01-17 DIAGNOSIS — Z98.890 OTHER SPECIFIED POSTPROCEDURAL STATES: Chronic | ICD-10-CM

## 2022-01-17 DIAGNOSIS — O09.299 SUPERVISION OF PREGNANCY WITH OTHER POOR REPRODUCTIVE OR OBSTETRIC HISTORY, UNSPECIFIED TRIMESTER: Chronic | ICD-10-CM

## 2022-01-17 NOTE — OB RN TRIAGE NOTE - FALL HARM RISK - UNIVERSAL INTERVENTIONS
Bed in lowest position, wheels locked, appropriate side rails in place/Call bell, personal items and telephone in reach/Instruct patient to call for assistance before getting out of bed or chair/Non-slip footwear when patient is out of bed/[place] to call system/Physically safe environment - no spills, clutter or unnecessary equipment/Purposeful Proactive Rounding/Room/bathroom lighting operational, light cord in reach

## 2022-01-17 NOTE — OB PROVIDER TRIAGE NOTE - NSOBPROVIDERNOTE_OBGYN_ALL_OB_FT
30yo  @23w3d, DIANNA 22, s/p cerclage, h/o APLS on lovenox and ASA, with chronic constipation, not in  labor, reassuring maternal and fetal status    - encourage PO hydration  - Discussed Miralax without combining with docusate to avoid diarrhea. Counseled patient on PO hydration, fiber intake.   - f/u with GI on 22  -  labor precautions  - f/u with Dr.Aljanabi Sauer and  aware

## 2022-01-17 NOTE — OB PROVIDER TRIAGE NOTE - NS_OBGYNHISTORY_OBGYN_ALL_OB_FT
OBHx:   20w4d IUFD, PPROM, vaginal delivery  etop x1, with D&C  SABx1, no D&C    Gyn Hx: Denies h/o ovarian cysts, fibroids,  or abnormal pap smears. Reports abnormal pap smear s/p normal colposcopy in .

## 2022-01-17 NOTE — OB PROVIDER TRIAGE NOTE - HISTORY OF PRESENT ILLNESS
32yo  @23w3d, DIANNA 22, dated by first stewart vazquez, s/p cerclage on , presents for chief complaint of constipation. Patient has been using docusate and miralex combined which have resulted in diarrhea, and now switched to docusate BID. Patient has been having once daily bowel movements, last BM at 4pm today which was painful and very minimal bleeding. After BM patient began to feel abdominal tightening and a few episodes of sharp abdominal shooting pain in lower abdomen which resolved spontaneously. Patient also reports mild cramping, rated 3 out of 10, felt like "tightening". After BM patient still feels "backed up with stool". Patient has GI appointment on 21. Previous stool specimen was negative for infection. Patient Reports good PO hydration and trying to incorporate fiber into diet. Denies prior h/o constipation. Denies vaginal bleeding or leakage of fluid. Reports good fetal movement.  30yo  @23w3d, DIANNA 22, dated by first stewart vazquez, s/p cerclage on , presents for chief complaint of constipation. Patient has been using docusate and miralex combined which have resulted in diarrhea, and now switched to docusate BID. Patient has been having once daily bowel movements, last BM at 4pm today which was painful and very minimal bleeding. After BM patient began to feel abdominal tightening and a few episodes of sharp abdominal shooting pain in lower abdomen which resolved spontaneously. Patient also reports mild cramping, rated 3 out of 10, felt like "tightening". After BM patient still feels "backed up with stool". Patient has GI appointment on 21. Previous stool specimen was negative for infection. Patient Reports good PO hydration and trying to incorporate fiber into diet. Denies prior h/o constipation. Denies vaginal bleeding or leakage of fluid. Reports good fetal movement. Denies fever, chills, nausea, vomiting. Denies chest pain, SOB, cough. Denies dysuria, hematuria, increased urgency or frequency. H/o PPROM, IUFD with  @20w4d. H/o APLS, on lovenox 40mg qd, last dose this AM. Also on ASA, last dose 9:30am. Currently on vaginal progesterone. H/o multiple triage visits, last visit on  for vaginal pressure, UA, Ucx, and vaginitis swab were negative at that time. GBS unknown.

## 2022-01-17 NOTE — OB PROVIDER TRIAGE NOTE - NSHPPHYSICALEXAM_GEN_ALL_CORE
Vital Signs Last 24 Hrs  T(C): 37.1 (17 Jan 2022 19:33), Max: 37.1 (17 Jan 2022 19:33)  T(F): 98.8 (17 Jan 2022 19:33), Max: 98.8 (17 Jan 2022 19:33)  HR: 97 (17 Jan 2022 19:33) (97 - 97)  BP: 134/73 (17 Jan 2022 19:33) (134/73 - 134/73)  RR: 16 (17 Jan 2022 19:33) (16 - 16)    Gen: AOx3, NAD  CV: normal s1, s2  Pulm: CTAB  Abd: soft, gravid, nontender, no palpable contractions  Ext: no swelling  Speculum: thick white discharge, no bleeding, no pooling, cervix appears closed  Bedside sono: breech, posterior placenta, FHR 157bpm, MVP 5cm    EFM: n/a  [place]: uterine irritability  SVE: 0/0/-3, cerclage strings palpated with no tension noted

## 2022-01-17 NOTE — OB RN TRIAGE NOTE - FALL HARM RISK - PT AGE POPULATION HIDDEN
Called patient no answer left message instructing to return call to schedule ILR implantation if ready to proceed or appointment with Dr. Rex Rosado if he would like to discuss the ILR implantation further before making his decision to proceed. Adult

## 2022-01-17 NOTE — OB PROVIDER TRIAGE NOTE - NSHPLABSRESULTS_GEN_ALL_CORE
11/30 posterior complete previa, , CL 3.8cm  12/14 SIUP, CL 2.5cm  21w1d: normal anatomy, posterior low-lying placenta, 472g, MVP 5.39cm, cerclage noted

## 2022-01-17 NOTE — OB PROVIDER TRIAGE NOTE - NSICDXFAMILYHX_GEN_ALL_CORE_FT
FAMILY HISTORY:  Father  Still living? Unknown  FH: hypertension, Age at diagnosis: Age Unknown  FHx: diabetes mellitus, Age at diagnosis: Age Unknown    Grandparent  Still living? Unknown  FH: colon cancer, Age at diagnosis: Age Unknown    Aunt  Still living? Unknown  Family history of breast cancer, Age at diagnosis: Age Unknown

## 2022-01-18 ENCOUNTER — APPOINTMENT (OUTPATIENT)
Dept: ANTEPARTUM | Facility: CLINIC | Age: 32
End: 2022-01-18

## 2022-01-18 DIAGNOSIS — Z3A.22 22 WEEKS GESTATION OF PREGNANCY: ICD-10-CM

## 2022-01-18 DIAGNOSIS — O09.212 SUPERVISION OF PREGNANCY WITH HISTORY OF PRE-TERM LABOR, SECOND TRIMESTER: ICD-10-CM

## 2022-01-18 DIAGNOSIS — O34.30 MATERNAL CARE FOR CERVICAL INCOMPETENCE, UNSPECIFIED TRIMESTER: ICD-10-CM

## 2022-01-25 ENCOUNTER — APPOINTMENT (OUTPATIENT)
Dept: ANTEPARTUM | Facility: CLINIC | Age: 32
End: 2022-01-25

## 2022-02-01 NOTE — ED ADULT NURSE NOTE - CAS DISCH ACCOMP BY
CC:  Janine RAMOS Cheema is here today for Vaginal Problem (She is taking antibitoics and now has vaginal itching and a little discharge that is white that started yesterday)    Medications: medications verified and updated  Added preferred pharmacy  Refills needed today?  NO    Health Maintenance Due   Topic Date Due   • COVID-19 Vaccine (1) Never done   • Influenza Vaccine (1) 09/01/2021     Patient is due for topics as listed above but is not proceeding with Immunization(s) COVID-19 and Influenza at this time.                        Self

## 2022-03-28 ENCOUNTER — APPOINTMENT (OUTPATIENT)
Dept: ANTEPARTUM | Facility: CLINIC | Age: 32
End: 2022-03-28
Payer: COMMERCIAL

## 2022-03-28 ENCOUNTER — OUTPATIENT (OUTPATIENT)
Dept: OUTPATIENT SERVICES | Facility: HOSPITAL | Age: 32
LOS: 1 days | Discharge: HOME | End: 2022-03-28

## 2022-03-28 ENCOUNTER — ASOB RESULT (OUTPATIENT)
Age: 32
End: 2022-03-28

## 2022-03-28 DIAGNOSIS — O09.299 SUPERVISION OF PREGNANCY WITH OTHER POOR REPRODUCTIVE OR OBSTETRIC HISTORY, UNSPECIFIED TRIMESTER: Chronic | ICD-10-CM

## 2022-03-28 DIAGNOSIS — Z98.890 OTHER SPECIFIED POSTPROCEDURAL STATES: Chronic | ICD-10-CM

## 2022-03-28 PROCEDURE — 76816 OB US FOLLOW-UP PER FETUS: CPT | Mod: 26

## 2022-04-22 ENCOUNTER — OUTPATIENT (OUTPATIENT)
Dept: OUTPATIENT SERVICES | Facility: HOSPITAL | Age: 32
LOS: 1 days | Discharge: HOME | End: 2022-04-22

## 2022-04-22 ENCOUNTER — APPOINTMENT (OUTPATIENT)
Dept: ANTEPARTUM | Facility: CLINIC | Age: 32
End: 2022-04-22
Payer: COMMERCIAL

## 2022-04-22 ENCOUNTER — ASOB RESULT (OUTPATIENT)
Age: 32
End: 2022-04-22

## 2022-04-22 VITALS — SYSTOLIC BLOOD PRESSURE: 105 MMHG | DIASTOLIC BLOOD PRESSURE: 63 MMHG

## 2022-04-22 DIAGNOSIS — O09.299 SUPERVISION OF PREGNANCY WITH OTHER POOR REPRODUCTIVE OR OBSTETRIC HISTORY, UNSPECIFIED TRIMESTER: Chronic | ICD-10-CM

## 2022-04-22 DIAGNOSIS — Z98.890 OTHER SPECIFIED POSTPROCEDURAL STATES: Chronic | ICD-10-CM

## 2022-04-22 PROCEDURE — ZZZZZ: CPT

## 2022-04-22 PROCEDURE — 76816 OB US FOLLOW-UP PER FETUS: CPT | Mod: 26

## 2022-04-22 PROCEDURE — 76818 FETAL BIOPHYS PROFILE W/NST: CPT | Mod: 26

## 2022-04-27 ENCOUNTER — OUTPATIENT (OUTPATIENT)
Dept: OUTPATIENT SERVICES | Facility: HOSPITAL | Age: 32
LOS: 1 days | Discharge: HOME | End: 2022-04-27

## 2022-04-27 ENCOUNTER — APPOINTMENT (OUTPATIENT)
Dept: ANTEPARTUM | Facility: CLINIC | Age: 32
End: 2022-04-27
Payer: COMMERCIAL

## 2022-04-27 VITALS
HEART RATE: 100 BPM | TEMPERATURE: 98.1 F | SYSTOLIC BLOOD PRESSURE: 101 MMHG | WEIGHT: 160 LBS | DIASTOLIC BLOOD PRESSURE: 57 MMHG | BODY MASS INDEX: 28.34 KG/M2

## 2022-04-27 DIAGNOSIS — Z80.42 FAMILY HISTORY OF MALIGNANT NEOPLASM OF PROSTATE: ICD-10-CM

## 2022-04-27 DIAGNOSIS — Z83.3 FAMILY HISTORY OF DIABETES MELLITUS: ICD-10-CM

## 2022-04-27 DIAGNOSIS — Z98.890 OTHER SPECIFIED POSTPROCEDURAL STATES: Chronic | ICD-10-CM

## 2022-04-27 DIAGNOSIS — O09.90 SUPERVISION OF HIGH RISK PREGNANCY, UNSPECIFIED, UNSPECIFIED TRIMESTER: ICD-10-CM

## 2022-04-27 DIAGNOSIS — O09.299 SUPERVISION OF PREGNANCY WITH OTHER POOR REPRODUCTIVE OR OBSTETRIC HISTORY, UNSPECIFIED TRIMESTER: ICD-10-CM

## 2022-04-27 DIAGNOSIS — Z86.2 PERSONAL HISTORY OF DISEASES OF THE BLOOD AND BLOOD-FORMING ORGANS AND CERTAIN DISORDERS INVOLVING THE IMMUNE MECHANISM: ICD-10-CM

## 2022-04-27 DIAGNOSIS — F41.1 GENERALIZED ANXIETY DISORDER: ICD-10-CM

## 2022-04-27 DIAGNOSIS — Z80.0 FAMILY HISTORY OF MALIGNANT NEOPLASM OF DIGESTIVE ORGANS: ICD-10-CM

## 2022-04-27 DIAGNOSIS — Z80.3 FAMILY HISTORY OF MALIGNANT NEOPLASM OF BREAST: ICD-10-CM

## 2022-04-27 DIAGNOSIS — Z82.49 FAMILY HISTORY OF ISCHEMIC HEART DISEASE AND OTHER DISEASES OF THE CIRCULATORY SYSTEM: ICD-10-CM

## 2022-04-27 DIAGNOSIS — Z82.3 FAMILY HISTORY OF STROKE: ICD-10-CM

## 2022-04-27 DIAGNOSIS — O09.299 SUPERVISION OF PREGNANCY WITH OTHER POOR REPRODUCTIVE OR OBSTETRIC HISTORY, UNSPECIFIED TRIMESTER: Chronic | ICD-10-CM

## 2022-04-27 LAB
BILIRUB UR QL STRIP: NORMAL
BP DIAS: 57 MM HG
BP SYS: 101 MM HG
CLARITY UR: CLEAR
COLLECTION METHOD: NORMAL
FETAL HEART DESCRIPTION: NORMAL
FETAL HEART RATE (BPM): 152
FETAL MOVEMENT: PRESENT
GLUCOSE UR-MCNC: NORMAL
HCG UR QL: 0.2 EU/DL
HGB UR QL STRIP.AUTO: NORMAL
KETONES UR-MCNC: NORMAL
LEUKOCYTE ESTERASE UR QL STRIP: NORMAL
NITRITE UR QL STRIP: NORMAL
PH UR STRIP: 6
PROT UR STRIP-MCNC: NORMAL
SCHEDULED VISIT: YES
SP GR UR STRIP: 1.01
URINE ALBUMIN/PROTEIN: NORMAL
URINE GLUCOSE: NORMAL
URINE KETONES: NORMAL
WEEKS GESTATION: NORMAL

## 2022-04-27 PROCEDURE — 99214 OFFICE O/P EST MOD 30 MIN: CPT

## 2022-04-27 RX ORDER — ASPIRIN 81 MG
81 TABLET, DELAYED RELEASE (ENTERIC COATED) ORAL
Refills: 0 | Status: DISCONTINUED | COMMUNITY
End: 2022-04-27

## 2022-04-27 NOTE — ACTIVE PROBLEMS
[Diabetes Mellitus] : no diabetes mellitus [Hypertension] : no hypertension [Heart Disease] : no heart disease [Autoimmune Disease] : no autoimmune disease [Renal Disease] : no kidney disease, no UTI [Neurologic Disorder] : no neurologic disorder, no epilepsy [Hepatic Disorder] : no hepatitis, no liver disease [Thyroid Disorder] : no thyroid dysfunction [Trauma] : no trauma/violence [Blood Transfusion (___ Ml)] : no history of blood transfusion

## 2022-04-27 NOTE — DISCUSSION/SUMMARY
[FreeTextEntry1] : Patient desires a primary c/section as soon as possible because she is very anxious. Discussed the risks and benefits awaiting spontaneous labor after cerclage removal, inducing labor after cerclage removal and performing a primary c/section. She had the opportunity to ask questions and they were answered. I advise removing the cerclage now and awaiting spontaneous labor, although the other two options are certainly reasonable. She will think it over and discuss it with her primary obstetrician.\par

## 2022-04-27 NOTE — SURGICAL HISTORY
[Abn Paps] : abnormal pap smear [OC Use] : OC use [Fibroids] : no fibroids [Breast Disease] : no breast disease [STI's] : no STI's [Infertility] : no infertility [Cysts] : no cysts

## 2022-04-27 NOTE — OB HISTORY
[LMP: ___] : LMP: [unfilled] [DIANNA: ___] : DIANNA: [unfilled] [EGA: ___ wks] : EGA: [unfilled] wks [___] :  Spontaneous: [unfilled] [FreeTextEntry1] : Lovenox 40 mg a day\par Had cerclage placed at 21 weeks for 2.5 cm cervical length\par On vaginal progesterone.\par Was on aspirin 81 mg/day stopped last week.\par

## 2022-05-03 ENCOUNTER — OUTPATIENT (OUTPATIENT)
Dept: OUTPATIENT SERVICES | Facility: HOSPITAL | Age: 32
LOS: 1 days | Discharge: HOME | End: 2022-05-03

## 2022-05-03 VITALS
RESPIRATION RATE: 18 BRPM | DIASTOLIC BLOOD PRESSURE: 73 MMHG | SYSTOLIC BLOOD PRESSURE: 116 MMHG | TEMPERATURE: 98 F | HEART RATE: 99 BPM

## 2022-05-03 DIAGNOSIS — Z98.890 OTHER SPECIFIED POSTPROCEDURAL STATES: Chronic | ICD-10-CM

## 2022-05-03 DIAGNOSIS — O09.299 SUPERVISION OF PREGNANCY WITH OTHER POOR REPRODUCTIVE OR OBSTETRIC HISTORY, UNSPECIFIED TRIMESTER: Chronic | ICD-10-CM

## 2022-05-03 NOTE — OB PROVIDER TRIAGE NOTE - NS_OBGYNHISTORY_OBGYN_ALL_OB_FT
2014 eTOP w/ D&C  2020 PPROM @ 20.5wks induced  2021 complete AB at 6 wks    Denies STI, PID, abnl PAP, fibroids, cysts

## 2022-05-03 NOTE — OB RN TRIAGE NOTE - FALL HARM RISK - UNIVERSAL INTERVENTIONS
Bed in lowest position, wheels locked, appropriate side rails in place/Call bell, personal items and telephone in reach/Instruct patient to call for assistance before getting out of bed or chair/Non-slip footwear when patient is out of bed/[place] to call system/Physically safe environment - no spills, clutter or unnecessary equipment/Purposeful Proactive Rounding/Room/bathroom lighting operational, light cord in reach

## 2022-05-03 NOTE — OB PROVIDER TRIAGE NOTE - ADDITIONAL INSTRUCTIONS
Increase your fluid intake  If you experience increase in frequency and intensity of contractions, leaking of fluid or vaginal bleeding call your doctor or return to the hospital  Follow up as scheduled with your doctor

## 2022-05-03 NOTE — OB PROVIDER TRIAGE NOTE - NS_SONONOTE_OBGYN_ALL_OB_FT
Controlled Substance Refill Request for   ALPRAZolam (XANAX) 2 MG tablet  30 tablet  0  4/7/2020   No    Sig - Route: Take 1 tablet (2 mg) by mouth 3 times daily as needed for anxiety     Problem List Complete:  Yes  Patient is followed by MICHAEL OROPEZA for ongoing prescription of benzodiazepines.  All refills should be approved by this provider, or covering partner.    Medication(s): alprazolam 2 mg .   Maximum quantity per month: 36  Clinic visit frequency required: Q 6  months     Controlled substance agreement on file: No  Benzodiazepine use reviewed by psychiatry:  No    Last [facility] website verification:    checked in past 3 months?  Yes per review of  on 5/8/20, last fill of alprazolam was on 4/7/20.  also reflects pt use of Adderall and Percocet    ADRY ArnoldN, RN    
Patient is calling to ask pcp to refill alprazolam     Thank you   
BPP 8/8

## 2022-05-03 NOTE — OB PROVIDER TRIAGE NOTE - HISTORY OF PRESENT ILLNESS
32y.o.  @ 38.1wks presents c/o decreased FM x 2 days. Pt states she felt FM but less than usual. Pt reports feeling fetal movement since arriving to L&D. Pt denies ctx. SATHYA or VB. Pt had cerclage removed 1 wk ago and is scheduled for elective primary  on 2022. Pt is currently on Lovenox 40mg QD for Antiphospholipid antibody.

## 2022-05-03 NOTE — OB PROVIDER TRIAGE NOTE - NSHPPHYSICALEXAM_GEN_ALL_CORE
T(C): 36.6 (05-03-22 @ 11:44), Max: 36.6 (05-03-22 @ 11:44)  HR: 99 (05-03-22 @ 11:44) (99 - 99)  BP: 116/73 (05-03-22 @ 11:44) (116/73 - 116/73)  RR: 18 (05-03-22 @ 11:44) (18 - 18)    Abd: gravid, soft, NT  VE: deferred  Ctx: none noted  FHR: 140 moderate variability. Cat I

## 2022-05-03 NOTE — OB PROVIDER TRIAGE NOTE - NSOBPROVIDERNOTE_OBGYN_ALL_OB_FT
32y.o.  @ 38.1wks assured fetal well being, BPP 10/10, for elective primary  2022, Anti-phospholipid syndrome on Lovenox, s/p Cerclage removal  - Discharge to home  - Po hydration  - Labor precautions and fetal kick counts  - Pt to change to Heparin starting today  - Follow up as scheduled  - Dr. Haq/ Dr. Mendoza aware

## 2022-05-06 ENCOUNTER — TRANSCRIPTION ENCOUNTER (OUTPATIENT)
Age: 32
End: 2022-05-06

## 2022-05-06 ENCOUNTER — INPATIENT (INPATIENT)
Facility: HOSPITAL | Age: 32
LOS: 1 days | Discharge: HOME | End: 2022-05-08
Attending: OBSTETRICS & GYNECOLOGY | Admitting: OBSTETRICS & GYNECOLOGY

## 2022-05-06 VITALS — HEART RATE: 87 BPM | SYSTOLIC BLOOD PRESSURE: 110 MMHG | DIASTOLIC BLOOD PRESSURE: 55 MMHG

## 2022-05-06 DIAGNOSIS — Z98.890 OTHER SPECIFIED POSTPROCEDURAL STATES: Chronic | ICD-10-CM

## 2022-05-06 DIAGNOSIS — O09.299 SUPERVISION OF PREGNANCY WITH OTHER POOR REPRODUCTIVE OR OBSTETRIC HISTORY, UNSPECIFIED TRIMESTER: Chronic | ICD-10-CM

## 2022-05-06 LAB
AMPHET UR-MCNC: NEGATIVE — SIGNIFICANT CHANGE UP
APPEARANCE UR: CLEAR — SIGNIFICANT CHANGE UP
APTT BLD: 26 SEC — LOW (ref 27–39.2)
BACTERIA # UR AUTO: NEGATIVE — SIGNIFICANT CHANGE UP
BARBITURATES UR SCN-MCNC: NEGATIVE — SIGNIFICANT CHANGE UP
BASOPHILS # BLD AUTO: 0.02 K/UL — SIGNIFICANT CHANGE UP (ref 0–0.2)
BASOPHILS NFR BLD AUTO: 0.3 % — SIGNIFICANT CHANGE UP (ref 0–1)
BENZODIAZ UR-MCNC: NEGATIVE — SIGNIFICANT CHANGE UP
BILIRUB UR-MCNC: NEGATIVE — SIGNIFICANT CHANGE UP
BLD GP AB SCN SERPL QL: SIGNIFICANT CHANGE UP
BUPRENORPHINE SCREEN, URINE RESULT: NEGATIVE — SIGNIFICANT CHANGE UP
COCAINE METAB.OTHER UR-MCNC: NEGATIVE — SIGNIFICANT CHANGE UP
COLOR SPEC: SIGNIFICANT CHANGE UP
DIFF PNL FLD: ABNORMAL
EOSINOPHIL # BLD AUTO: 0.06 K/UL — SIGNIFICANT CHANGE UP (ref 0–0.7)
EOSINOPHIL NFR BLD AUTO: 0.9 % — SIGNIFICANT CHANGE UP (ref 0–8)
EPI CELLS # UR: 3 /HPF — SIGNIFICANT CHANGE UP (ref 0–5)
FENTANYL UR QL: NEGATIVE — SIGNIFICANT CHANGE UP
FIBRINOGEN PPP-MCNC: >700 MG/DL — HIGH (ref 204.4–570.6)
GLUCOSE BLDC GLUCOMTR-MCNC: 88 MG/DL — SIGNIFICANT CHANGE UP (ref 70–99)
GLUCOSE UR QL: NEGATIVE — SIGNIFICANT CHANGE UP
HCT VFR BLD CALC: 33.3 % — LOW (ref 37–47)
HGB BLD-MCNC: 11.2 G/DL — LOW (ref 12–16)
HIV 1 & 2 AB SERPL IA.RAPID: SIGNIFICANT CHANGE UP
HYALINE CASTS # UR AUTO: 4 /LPF — SIGNIFICANT CHANGE UP (ref 0–7)
IMM GRANULOCYTES NFR BLD AUTO: 1 % — HIGH (ref 0.1–0.3)
INR BLD: 0.95 RATIO — SIGNIFICANT CHANGE UP (ref 0.65–1.3)
KETONES UR-MCNC: NEGATIVE — SIGNIFICANT CHANGE UP
L&D DRUG SCREEN, URINE: SIGNIFICANT CHANGE UP
LEUKOCYTE ESTERASE UR-ACNC: ABNORMAL
LYMPHOCYTES # BLD AUTO: 1.92 K/UL — SIGNIFICANT CHANGE UP (ref 1.2–3.4)
LYMPHOCYTES # BLD AUTO: 27.7 % — SIGNIFICANT CHANGE UP (ref 20.5–51.1)
MCHC RBC-ENTMCNC: 30.3 PG — SIGNIFICANT CHANGE UP (ref 27–31)
MCHC RBC-ENTMCNC: 33.6 G/DL — SIGNIFICANT CHANGE UP (ref 32–37)
MCV RBC AUTO: 90 FL — SIGNIFICANT CHANGE UP (ref 81–99)
METHADONE UR-MCNC: NEGATIVE — SIGNIFICANT CHANGE UP
MONOCYTES # BLD AUTO: 0.73 K/UL — HIGH (ref 0.1–0.6)
MONOCYTES NFR BLD AUTO: 10.5 % — HIGH (ref 1.7–9.3)
NEUTROPHILS # BLD AUTO: 4.12 K/UL — SIGNIFICANT CHANGE UP (ref 1.4–6.5)
NEUTROPHILS NFR BLD AUTO: 59.6 % — SIGNIFICANT CHANGE UP (ref 42.2–75.2)
NITRITE UR-MCNC: NEGATIVE — SIGNIFICANT CHANGE UP
NRBC # BLD: 0 /100 WBCS — SIGNIFICANT CHANGE UP (ref 0–0)
OPIATES UR-MCNC: NEGATIVE — SIGNIFICANT CHANGE UP
OXYCODONE UR-MCNC: NEGATIVE — SIGNIFICANT CHANGE UP
PCP UR-MCNC: NEGATIVE — SIGNIFICANT CHANGE UP
PH UR: 6 — SIGNIFICANT CHANGE UP (ref 5–8)
PLATELET # BLD AUTO: 120 K/UL — LOW (ref 130–400)
PRENATAL SYPHILIS TEST: SIGNIFICANT CHANGE UP
PROPOXYPHENE QUALITATIVE URINE RESULT: NEGATIVE — SIGNIFICANT CHANGE UP
PROT UR-MCNC: NEGATIVE — SIGNIFICANT CHANGE UP
PROTHROM AB SERPL-ACNC: 10.9 SEC — SIGNIFICANT CHANGE UP (ref 9.95–12.87)
RBC # BLD: 3.7 M/UL — LOW (ref 4.2–5.4)
RBC # FLD: 14.7 % — HIGH (ref 11.5–14.5)
RBC CASTS # UR COMP ASSIST: 2 /HPF — SIGNIFICANT CHANGE UP (ref 0–4)
SP GR SPEC: 1.01 — SIGNIFICANT CHANGE UP (ref 1.01–1.03)
UROBILINOGEN FLD QL: SIGNIFICANT CHANGE UP
WBC # BLD: 6.92 K/UL — SIGNIFICANT CHANGE UP (ref 4.8–10.8)
WBC # FLD AUTO: 6.92 K/UL — SIGNIFICANT CHANGE UP (ref 4.8–10.8)
WBC UR QL: 8 /HPF — HIGH (ref 0–5)

## 2022-05-06 RX ORDER — CEFAZOLIN SODIUM 1 G
2000 VIAL (EA) INJECTION ONCE
Refills: 0 | Status: COMPLETED | OUTPATIENT
Start: 2022-05-06 | End: 2022-05-06

## 2022-05-06 RX ORDER — OXYTOCIN 10 UNIT/ML
333.33 VIAL (ML) INJECTION
Qty: 20 | Refills: 0 | Status: DISCONTINUED | OUTPATIENT
Start: 2022-05-06 | End: 2022-05-08

## 2022-05-06 RX ORDER — MAGNESIUM HYDROXIDE 400 MG/1
30 TABLET, CHEWABLE ORAL
Refills: 0 | Status: DISCONTINUED | OUTPATIENT
Start: 2022-05-06 | End: 2022-05-08

## 2022-05-06 RX ORDER — ACETAMINOPHEN 500 MG
975 TABLET ORAL
Refills: 0 | Status: DISCONTINUED | OUTPATIENT
Start: 2022-05-06 | End: 2022-05-08

## 2022-05-06 RX ORDER — SODIUM CHLORIDE 9 MG/ML
1000 INJECTION, SOLUTION INTRAVENOUS
Refills: 0 | Status: DISCONTINUED | OUTPATIENT
Start: 2022-05-06 | End: 2022-05-08

## 2022-05-06 RX ORDER — FAMOTIDINE 10 MG/ML
20 INJECTION INTRAVENOUS ONCE
Refills: 0 | Status: COMPLETED | OUTPATIENT
Start: 2022-05-06 | End: 2022-05-06

## 2022-05-06 RX ORDER — ENOXAPARIN SODIUM 100 MG/ML
40 INJECTION SUBCUTANEOUS EVERY 24 HOURS
Refills: 0 | Status: DISCONTINUED | OUTPATIENT
Start: 2022-05-06 | End: 2022-05-08

## 2022-05-06 RX ORDER — IBUPROFEN 200 MG
600 TABLET ORAL EVERY 6 HOURS
Refills: 0 | Status: COMPLETED | OUTPATIENT
Start: 2022-05-06 | End: 2023-04-04

## 2022-05-06 RX ORDER — OXYCODONE HYDROCHLORIDE 5 MG/1
5 TABLET ORAL
Refills: 0 | Status: DISCONTINUED | OUTPATIENT
Start: 2022-05-06 | End: 2022-05-08

## 2022-05-06 RX ORDER — SODIUM CHLORIDE 9 MG/ML
1000 INJECTION, SOLUTION INTRAVENOUS ONCE
Refills: 0 | Status: DISCONTINUED | OUTPATIENT
Start: 2022-05-06 | End: 2022-05-06

## 2022-05-06 RX ORDER — TETANUS TOXOID, REDUCED DIPHTHERIA TOXOID AND ACELLULAR PERTUSSIS VACCINE, ADSORBED 5; 2.5; 8; 8; 2.5 [IU]/.5ML; [IU]/.5ML; UG/.5ML; UG/.5ML; UG/.5ML
0.5 SUSPENSION INTRAMUSCULAR ONCE
Refills: 0 | Status: DISCONTINUED | OUTPATIENT
Start: 2022-05-06 | End: 2022-05-08

## 2022-05-06 RX ORDER — METOCLOPRAMIDE HCL 10 MG
10 TABLET ORAL ONCE
Refills: 0 | Status: COMPLETED | OUTPATIENT
Start: 2022-05-06 | End: 2022-05-06

## 2022-05-06 RX ORDER — SODIUM CHLORIDE 9 MG/ML
1000 INJECTION, SOLUTION INTRAVENOUS
Refills: 0 | Status: DISCONTINUED | OUTPATIENT
Start: 2022-05-06 | End: 2022-05-06

## 2022-05-06 RX ORDER — CITRIC ACID/SODIUM CITRATE 300-500 MG
30 SOLUTION, ORAL ORAL ONCE
Refills: 0 | Status: COMPLETED | OUTPATIENT
Start: 2022-05-06 | End: 2022-05-06

## 2022-05-06 RX ORDER — SIMETHICONE 80 MG/1
80 TABLET, CHEWABLE ORAL EVERY 4 HOURS
Refills: 0 | Status: DISCONTINUED | OUTPATIENT
Start: 2022-05-06 | End: 2022-05-08

## 2022-05-06 RX ORDER — SENNA PLUS 8.6 MG/1
2 TABLET ORAL AT BEDTIME
Refills: 0 | Status: DISCONTINUED | OUTPATIENT
Start: 2022-05-06 | End: 2022-05-08

## 2022-05-06 RX ORDER — LANOLIN
1 OINTMENT (GRAM) TOPICAL EVERY 6 HOURS
Refills: 0 | Status: DISCONTINUED | OUTPATIENT
Start: 2022-05-06 | End: 2022-05-08

## 2022-05-06 RX ORDER — KETOROLAC TROMETHAMINE 30 MG/ML
30 SYRINGE (ML) INJECTION EVERY 6 HOURS
Refills: 0 | Status: DISCONTINUED | OUTPATIENT
Start: 2022-05-06 | End: 2022-05-06

## 2022-05-06 RX ORDER — OXYCODONE HYDROCHLORIDE 5 MG/1
5 TABLET ORAL ONCE
Refills: 0 | Status: DISCONTINUED | OUTPATIENT
Start: 2022-05-06 | End: 2022-05-08

## 2022-05-06 RX ORDER — DIPHENHYDRAMINE HCL 50 MG
25 CAPSULE ORAL EVERY 6 HOURS
Refills: 0 | Status: DISCONTINUED | OUTPATIENT
Start: 2022-05-06 | End: 2022-05-08

## 2022-05-06 RX ADMIN — Medication 30 MILLILITER(S): at 07:55

## 2022-05-06 RX ADMIN — FAMOTIDINE 20 MILLIGRAM(S): 10 INJECTION INTRAVENOUS at 07:57

## 2022-05-06 RX ADMIN — ENOXAPARIN SODIUM 40 MILLIGRAM(S): 100 INJECTION SUBCUTANEOUS at 22:22

## 2022-05-06 RX ADMIN — Medication 975 MILLIGRAM(S): at 16:58

## 2022-05-06 RX ADMIN — Medication 100 MILLIGRAM(S): at 08:30

## 2022-05-06 RX ADMIN — Medication 30 MILLIGRAM(S): at 23:31

## 2022-05-06 RX ADMIN — Medication 975 MILLIGRAM(S): at 21:19

## 2022-05-06 RX ADMIN — Medication 10 MILLIGRAM(S): at 08:00

## 2022-05-06 RX ADMIN — SIMETHICONE 80 MILLIGRAM(S): 80 TABLET, CHEWABLE ORAL at 17:54

## 2022-05-06 RX ADMIN — SIMETHICONE 80 MILLIGRAM(S): 80 TABLET, CHEWABLE ORAL at 23:22

## 2022-05-06 RX ADMIN — Medication 975 MILLIGRAM(S): at 20:01

## 2022-05-06 RX ADMIN — SENNA PLUS 2 TABLET(S): 8.6 TABLET ORAL at 23:22

## 2022-05-06 NOTE — BRIEF OPERATIVE NOTE - NSICDXBRIEFPOSTOP_GEN_ALL_CORE_FT
POST-OP DIAGNOSIS:  Term pregnancy 06-May-2022 10:05:49  Jennifer Dalton  Delivery by elective  section 06-May-2022 10:06:11  Jennifer Dalton

## 2022-05-06 NOTE — OB RN PATIENT PROFILE - FALL HARM RISK - UNIVERSAL INTERVENTIONS
Bed in lowest position, wheels locked, appropriate side rails in place/Call bell, personal items and telephone in reach/Instruct patient to call for assistance before getting out of bed or chair/Non-slip footwear when patient is out of bed/[place] to call system/Physically safe environment - no spills, clutter or unnecessary equipment/Purposeful Proactive Rounding/Room/bathroom lighting operational, light cord in reach

## 2022-05-06 NOTE — OB PROVIDER H&P - NS_OBGYNHISTORY_OBGYN_ALL_OB_FT
OB Hx: ETOP x 1.                SAB x 2. D&C x 1         Gyn Hx: h/o cysts  Denies fibroids, abnormal paps, and STIs.

## 2022-05-06 NOTE — OB PROVIDER H&P - NSHPPHYSICALEXAM_GEN_ALL_CORE
T(C): 36.6 (05-06-22 @ 05:31), Max: 36.6 (05-06-22 @ 05:31)  HR: 87 (05-06-22 @ 05:31) (87 - 87)  BP: 110/55 (05-06-22 @ 05:31) (110/55 - 110/55)  RR: 18 (05-06-22 @ 05:31) (18 - 18)    EFM: 140 bpm, moderate variability, +accels  TOCO: irregular    Abd: soft, gravid, nontender

## 2022-05-06 NOTE — OB RN INTRAOPERATIVE NOTE - NSSELHIDDEN_OBGYN_ALL_OB_FT
[NS_DeliveryAttending1_OBGYN_ALL_OB_FT:[id number]],[NS_DeliveryAssist1_OBGYN_ALL_OB_FT:[id number]=],[NS_DeliveryRN_OBGYN_ALL_OB_FT:[email or web address]] [NS_DeliveryAttending1_OBGYN_ALL_OB_FT:[id number]],[NS_DeliveryAssist1_OBGYN_ALL_OB_FT:[id number]=],[NS_DeliveryRN_OBGYN_ALL_OB_FT:[id number]=],[NS_CirculateRN2_OBGYN_ALL_OB_FT:[id number]=]

## 2022-05-06 NOTE — PRE-ANESTHESIA EVALUATION ADULT - NSANTHADDINFOFT_GEN_ALL_CORE
32y  @ 39 weeks by first trimester sonogram, DIANNA 2022, presents for scheduled primary C/S. Patient has h/o of Antiphospholipid Syndrome and Anxiety. Taking Lovenox then switched to Heparin. Last Heparin injection was on Wednesday. S/p cerclage for cervical insufficiency.

## 2022-05-06 NOTE — OB PROVIDER H&P - NSHPLABSRESULTS_GEN_ALL_CORE
GCT: 138    GTT: Fastin           1 hr: 153           2 hrs: 154           3 hrs: 126    Sonograms:  3/28/22: 33 weeks: EFW 2332 gms (73%), vtx, left lateral posterior placenta, no previa, VICKY 6.51 cm  22: 22.1 weeks: CL 1.4 cm  22: 21.1 weeks:  gms, vtx, post placenta, low lying, VICKY 5.39 cm, no major fetal malformations noted  21: 18.1 weeks: CL 2.5 cm

## 2022-05-06 NOTE — OB PROVIDER H&P - HISTORY OF PRESENT ILLNESS
32y  @ 39 weeks by first trimester sonogram, DIANNA 2022, presents for scheduled primary C/S. Patient has h/o of Antiphospholipid Syndrome and Anxiety. Taking Lovenox then switched to Heparin. Last Heparin injection was on Wednesday. S/p cerclage for cervical insufficiency. H/o IUFD at 20 weeks. Denies VB, LOF, and ctx. +FM. Last seen in the office on 22, 1 cm dilated.

## 2022-05-06 NOTE — OB PROVIDER H&P - PRO PRENATAL LABS ORI SOURCE HIV
Called Dr. Grzegorz Page office and left message regarding clearance for Aspirin 325mg and re-faxed request to Dr. Grzegorz Page office as well. hard copy, drawn during this pregnancy

## 2022-05-06 NOTE — OB RN DELIVERY SUMMARY - NS_SEPSISRSKCALC_OBGYN_ALL_OB_FT
No temperature has been documented for this patient in CPN or on the OB Flowsheet. Ensure the highest temperature during labor was documented on the OB Flowsheet.  No gestational age at birth has been documented. Ensure delivery date/time has been entered above.  Rupture of membranes must be entered above.  GBS status in the 'Prenatal Lab tests/results section' on the OB RN Patient Profile must be documented.   GBS status in the 'Prenatal Lab tests/results section' on the OB RN Patient Profile must be documented.

## 2022-05-06 NOTE — OB RN DELIVERY SUMMARY - NSSELHIDDEN_OBGYN_ALL_OB_FT
[NS_DeliveryAttending1_OBGYN_ALL_OB_FT:[id number]] [NS_DeliveryAttending1_OBGYN_ALL_OB_FT:[id number]=],[NS_DeliveryAssist1_OBGYN_ALL_OB_FT:[id number]=],[NS_DeliveryRN_OBGYN_ALL_OB_FT:[email or web address]=] [NS_DeliveryAttending1_OBGYN_ALL_OB_FT:[id number]=],[NS_DeliveryAssist1_OBGYN_ALL_OB_FT:[id number]=],[NS_DeliveryRN_OBGYN_ALL_OB_FT:[id number]=],[NS_CirculateRN2_OBGYN_ALL_OB_FT:[id number]=]

## 2022-05-06 NOTE — BRIEF OPERATIVE NOTE - OPERATION/FINDINGS
Pfannenstiel skin incision. Low transverse uterine incision. Clear amniotic fluid. Live male infant delivered in cephalic presentation. APGARs 9/9. Weight 3280g.

## 2022-05-06 NOTE — CHART NOTE - NSCHARTNOTEFT_GEN_A_CORE
PACU ANESTHESIA ADMISSION NOTE      Procedure: Elective Csection  Post op diagnosis:  IUP; elective  delivery    ____  Intubated  TV:______       Rate: ______      FiO2: ______    __x__  Patent Airway    __x__  Full return of protective reflexes    _x___  Regressing NAB  Vitals:   T:           R:                  BP:                  Sat:                   P:       Mental Status:  _x___ Awake   __x___ Alert   _____ Drowsy   _____ Sedated    Nausea/Vomiting:  __x__ NO  ______Yes,   See Post - Op Orders          Pain Scale (0-10):  _0/10____    Treatment: ____ None    __x__ See Post - Op/PCA Orders    Post - Operative Fluids:   ____ Oral   _x___ See Post - Op Orders    Plan: Discharge:   ____Home       __x___Floor     _____Critical Care    _____  Other:_________________    Comments: Pt tolerated procedure well, no anesthesia related complications. Care of pt endorsed to PACU, report given to PACU RN. Discharge when criteria are met.

## 2022-05-06 NOTE — OB PROVIDER DELIVERY SUMMARY - NSSELHIDDEN_OBGYN_ALL_OB_FT
[NS_DeliveryAttending1_OBGYN_ALL_OB_FT:[id number]],[NS_DeliveryAssist1_OBGYN_ALL_OB_FT:[id number]=],[NS_DeliveryRN_OBGYN_ALL_OB_FT:[email or web address]=],[NS_CirculateRN2_OBGYN_ALL_OB_FT:[id number]=]

## 2022-05-06 NOTE — OB PROVIDER H&P - ASSESSMENT
32y  @ 39 weeks, h/o antiphospholipid syndrome and anxiety, scheduled primary.    Plan:  Admit to L&D  IVF  NPO diet  Continuous TOCO/EFM  Ancef prior to OR  Abdominal prep  SCDs B/L  Pain Management PRN    Dr. Wilson aware

## 2022-05-07 LAB
BASOPHILS # BLD AUTO: 0 K/UL — SIGNIFICANT CHANGE UP (ref 0–0.2)
BASOPHILS # BLD AUTO: 0.01 K/UL — SIGNIFICANT CHANGE UP (ref 0–0.2)
BASOPHILS NFR BLD AUTO: 0 % — SIGNIFICANT CHANGE UP (ref 0–1)
BASOPHILS NFR BLD AUTO: 0.1 % — SIGNIFICANT CHANGE UP (ref 0–1)
EOSINOPHIL # BLD AUTO: 0.04 K/UL — SIGNIFICANT CHANGE UP (ref 0–0.7)
EOSINOPHIL # BLD AUTO: 0.05 K/UL — SIGNIFICANT CHANGE UP (ref 0–0.7)
EOSINOPHIL NFR BLD AUTO: 0.4 % — SIGNIFICANT CHANGE UP (ref 0–8)
EOSINOPHIL NFR BLD AUTO: 0.5 % — SIGNIFICANT CHANGE UP (ref 0–8)
HCT VFR BLD CALC: 26.2 % — LOW (ref 37–47)
HCT VFR BLD CALC: 27.2 % — LOW (ref 37–47)
HGB BLD-MCNC: 8.8 G/DL — LOW (ref 12–16)
HGB BLD-MCNC: 9.2 G/DL — LOW (ref 12–16)
IMM GRANULOCYTES NFR BLD AUTO: 0.6 % — HIGH (ref 0.1–0.3)
IMM GRANULOCYTES NFR BLD AUTO: 0.7 % — HIGH (ref 0.1–0.3)
LYMPHOCYTES # BLD AUTO: 1.71 K/UL — SIGNIFICANT CHANGE UP (ref 1.2–3.4)
LYMPHOCYTES # BLD AUTO: 16.3 % — LOW (ref 20.5–51.1)
LYMPHOCYTES # BLD AUTO: 2.36 K/UL — SIGNIFICANT CHANGE UP (ref 1.2–3.4)
LYMPHOCYTES # BLD AUTO: 22.4 % — SIGNIFICANT CHANGE UP (ref 20.5–51.1)
MCHC RBC-ENTMCNC: 30.5 PG — SIGNIFICANT CHANGE UP (ref 27–31)
MCHC RBC-ENTMCNC: 30.7 PG — SIGNIFICANT CHANGE UP (ref 27–31)
MCHC RBC-ENTMCNC: 33.6 G/DL — SIGNIFICANT CHANGE UP (ref 32–37)
MCHC RBC-ENTMCNC: 33.8 G/DL — SIGNIFICANT CHANGE UP (ref 32–37)
MCV RBC AUTO: 90.1 FL — SIGNIFICANT CHANGE UP (ref 81–99)
MCV RBC AUTO: 91.3 FL — SIGNIFICANT CHANGE UP (ref 81–99)
MONOCYTES # BLD AUTO: 0.87 K/UL — HIGH (ref 0.1–0.6)
MONOCYTES # BLD AUTO: 1.03 K/UL — HIGH (ref 0.1–0.6)
MONOCYTES NFR BLD AUTO: 8.3 % — SIGNIFICANT CHANGE UP (ref 1.7–9.3)
MONOCYTES NFR BLD AUTO: 9.8 % — HIGH (ref 1.7–9.3)
NEUTROPHILS # BLD AUTO: 7.04 K/UL — HIGH (ref 1.4–6.5)
NEUTROPHILS # BLD AUTO: 7.79 K/UL — HIGH (ref 1.4–6.5)
NEUTROPHILS NFR BLD AUTO: 66.7 % — SIGNIFICANT CHANGE UP (ref 42.2–75.2)
NEUTROPHILS NFR BLD AUTO: 74.2 % — SIGNIFICANT CHANGE UP (ref 42.2–75.2)
NRBC # BLD: 0 /100 WBCS — SIGNIFICANT CHANGE UP (ref 0–0)
NRBC # BLD: 0 /100 WBCS — SIGNIFICANT CHANGE UP (ref 0–0)
PLATELET # BLD AUTO: 108 K/UL — LOW (ref 130–400)
PLATELET # BLD AUTO: 118 K/UL — LOW (ref 130–400)
RBC # BLD: 2.87 M/UL — LOW (ref 4.2–5.4)
RBC # BLD: 3.02 M/UL — LOW (ref 4.2–5.4)
RBC # FLD: 14.6 % — HIGH (ref 11.5–14.5)
RBC # FLD: 14.6 % — HIGH (ref 11.5–14.5)
WBC # BLD: 10.49 K/UL — SIGNIFICANT CHANGE UP (ref 4.8–10.8)
WBC # BLD: 10.54 K/UL — SIGNIFICANT CHANGE UP (ref 4.8–10.8)
WBC # FLD AUTO: 10.49 K/UL — SIGNIFICANT CHANGE UP (ref 4.8–10.8)
WBC # FLD AUTO: 10.54 K/UL — SIGNIFICANT CHANGE UP (ref 4.8–10.8)

## 2022-05-07 RX ORDER — IBUPROFEN 200 MG
600 TABLET ORAL EVERY 6 HOURS
Refills: 0 | Status: DISCONTINUED | OUTPATIENT
Start: 2022-05-07 | End: 2022-05-08

## 2022-05-07 RX ADMIN — Medication 600 MILLIGRAM(S): at 12:10

## 2022-05-07 RX ADMIN — SIMETHICONE 80 MILLIGRAM(S): 80 TABLET, CHEWABLE ORAL at 06:10

## 2022-05-07 RX ADMIN — SIMETHICONE 80 MILLIGRAM(S): 80 TABLET, CHEWABLE ORAL at 12:09

## 2022-05-07 RX ADMIN — Medication 600 MILLIGRAM(S): at 06:09

## 2022-05-07 RX ADMIN — Medication 975 MILLIGRAM(S): at 15:24

## 2022-05-07 RX ADMIN — SIMETHICONE 80 MILLIGRAM(S): 80 TABLET, CHEWABLE ORAL at 18:24

## 2022-05-07 RX ADMIN — SENNA PLUS 2 TABLET(S): 8.6 TABLET ORAL at 21:46

## 2022-05-07 RX ADMIN — Medication 30 MILLIGRAM(S): at 00:44

## 2022-05-07 RX ADMIN — Medication 600 MILLIGRAM(S): at 06:35

## 2022-05-07 RX ADMIN — SIMETHICONE 80 MILLIGRAM(S): 80 TABLET, CHEWABLE ORAL at 21:46

## 2022-05-07 RX ADMIN — Medication 975 MILLIGRAM(S): at 21:46

## 2022-05-07 RX ADMIN — ENOXAPARIN SODIUM 40 MILLIGRAM(S): 100 INJECTION SUBCUTANEOUS at 21:47

## 2022-05-08 ENCOUNTER — TRANSCRIPTION ENCOUNTER (OUTPATIENT)
Age: 32
End: 2022-05-08

## 2022-05-08 VITALS
RESPIRATION RATE: 18 BRPM | HEART RATE: 90 BPM | DIASTOLIC BLOOD PRESSURE: 65 MMHG | TEMPERATURE: 99 F | SYSTOLIC BLOOD PRESSURE: 118 MMHG

## 2022-05-08 RX ORDER — PROGESTERONE 200 MG/1
0 CAPSULE, LIQUID FILLED ORAL
Qty: 0 | Refills: 0 | DISCHARGE

## 2022-05-08 RX ORDER — ENOXAPARIN SODIUM 100 MG/ML
40 INJECTION SUBCUTANEOUS
Qty: 0 | Refills: 0 | DISCHARGE

## 2022-05-08 RX ORDER — ENOXAPARIN SODIUM 100 MG/ML
40 INJECTION SUBCUTANEOUS
Qty: 42 | Refills: 0
Start: 2022-05-08 | End: 2022-06-18

## 2022-05-08 RX ORDER — ACETAMINOPHEN 500 MG
3 TABLET ORAL
Qty: 0 | Refills: 0 | DISCHARGE
Start: 2022-05-08

## 2022-05-08 RX ORDER — SENNA PLUS 8.6 MG/1
2 TABLET ORAL
Qty: 20 | Refills: 0
Start: 2022-05-08 | End: 2022-05-17

## 2022-05-08 RX ORDER — ONDANSETRON 8 MG/1
4 TABLET, FILM COATED ORAL ONCE
Refills: 0 | Status: COMPLETED | OUTPATIENT
Start: 2022-05-08 | End: 2022-05-08

## 2022-05-08 RX ORDER — SIMETHICONE 80 MG/1
1 TABLET, CHEWABLE ORAL
Qty: 60 | Refills: 0
Start: 2022-05-08 | End: 2022-05-17

## 2022-05-08 RX ORDER — DOCUSATE SODIUM 100 MG
1 CAPSULE ORAL
Qty: 30 | Refills: 3
Start: 2022-05-08 | End: 2022-09-04

## 2022-05-08 RX ORDER — ONDANSETRON 8 MG/1
4 TABLET, FILM COATED ORAL ONCE
Refills: 0 | Status: DISCONTINUED | OUTPATIENT
Start: 2022-05-08 | End: 2022-05-08

## 2022-05-08 RX ORDER — IBUPROFEN 200 MG
1 TABLET ORAL
Qty: 32 | Refills: 0
Start: 2022-05-08 | End: 2022-05-15

## 2022-05-08 RX ORDER — CALCIUM CARBONATE 500(1250)
1 TABLET ORAL EVERY 6 HOURS
Refills: 0 | Status: DISCONTINUED | OUTPATIENT
Start: 2022-05-08 | End: 2022-05-08

## 2022-05-08 RX ORDER — ASPIRIN/CALCIUM CARB/MAGNESIUM 324 MG
81 TABLET ORAL
Qty: 0 | Refills: 0 | DISCHARGE

## 2022-05-08 RX ADMIN — Medication 975 MILLIGRAM(S): at 04:00

## 2022-05-08 RX ADMIN — ONDANSETRON 4 MILLIGRAM(S): 8 TABLET, FILM COATED ORAL at 07:05

## 2022-05-08 RX ADMIN — Medication 975 MILLIGRAM(S): at 10:01

## 2022-05-08 RX ADMIN — SIMETHICONE 80 MILLIGRAM(S): 80 TABLET, CHEWABLE ORAL at 02:12

## 2022-05-08 RX ADMIN — SIMETHICONE 80 MILLIGRAM(S): 80 TABLET, CHEWABLE ORAL at 06:56

## 2022-05-08 RX ADMIN — Medication 600 MILLIGRAM(S): at 06:57

## 2022-05-08 RX ADMIN — Medication 600 MILLIGRAM(S): at 00:58

## 2022-05-08 RX ADMIN — SIMETHICONE 80 MILLIGRAM(S): 80 TABLET, CHEWABLE ORAL at 15:45

## 2022-05-08 NOTE — DISCHARGE NOTE OB - CARE PLAN
Principal Discharge DX:	 delivery delivered  Assessment and plan of treatment:	Keep incisions clean and dry. No heavy lifting x4 weeks. Nothing in the vagina for 6 weeks - no sex, tampons, douching, tub baths or pools. May Shower. If you have a fever over 100.4F, severe pain or severe bleeding, please call your doctor or visit the emergency room. Follow up in 1 week for incision check and 6 weeks for postpartum check with your doctor.  Secondary Diagnosis:	Antiphospholipid syndrome  Assessment and plan of treatment:	Continue with Lovenox 40mg daily for 6 weeks postpartum   1

## 2022-05-08 NOTE — PROGRESS NOTE ADULT - ASSESSMENT
31 yo P1 S/P primary elective LTCS POD#1, h/o antiphospholipid syndrome, h/o anxiety, recovering well     - TOV 1300  - pain management with PO pain meds   - monitor vitals/bleeding   - encourage incentive spirometry   - ambulation/PO hydration  - advance diet as tolerated   - simethicone  - SCDs/lovenox for DVT prophylaxis   - f/u AM labs   - routine postop care      and  to be made aware 
A/P: 33 yo P1 S/P primary elective LTCS POD#0, h/o antiphospholipid syndrome, h/o anxiety, recovering well   - sawyer until AM, f/u UO  - pain management with PO pain meds   - monitor vitals/bleeding   - encourage incentive spirometry   - ambulation/PO hydration  - advance diet as tolerated   - simethicone  - SCDs/lovenox for DVT prophylaxis   - f/u AM labs   - routine postop care     Dr Roldan and Dr Chauhan to be made aware
33 yo P1 S/P primary elective LTCS POD#2, h/o antiphospholipid syndrome, h/o anxiety, recovering well     - pain management with PO pain meds   - monitor vitals/bleeding   - encourage incentive spirometry   - ambulation/PO hydration  - advance diet as tolerated   - simethicone/senna  - SCDs/lovenox for DVT prophylaxis   - routine postop care   - anticipate discharge home today with instructions to f/u in 1 wk for incision check and in 6 wk for PP visit  - will send 6 week of lovenox to pharmacy for dvt ppx secondary to APLA    Dr Deshpande and Dr Vitale to be aware

## 2022-05-08 NOTE — PROGRESS NOTE ADULT - SUBJECTIVE AND OBJECTIVE BOX
PGY1 NOTE  Chief Complaint: Post  section day 2    HPI: Pt doing well, pain well controlled. No overnight events, no acute complaints. She has been ambulating, voiding, passing gas, and tolerating regular diet without difficulty. She is bottlefeeding. Denies HA, lightheadedness, palpitations, N/V, fevers, chills, CP, SOB, LE edema.    PAST MEDICAL & SURGICAL HISTORY:  Antiphospholipid syndrome  Anxiety  History of D&C x1  History of cerclage, removed    Physical Exam  Vital Signs Last 24 Hrs  T(F): 99.7 (08 May 2022 04:00), Max: 99.7 (08 May 2022 04:00)  HR: 99 (08 May 2022 04:00) (83 - 103)  BP: 120/65 (08 May 2022 04:00) (98/54 - 120/65)  RR: 19 (08 May 2022 04:00) (18 - 19)    Physical exam:  General - AAOx3, NAD  Heart - S1S2, RRR  Lungs - CTA BL  Abdomen:  - Soft, nontender, mildly distended, BS+  - Fundus firm, below the umbilicus  Incision - Clean, dry, intact steri strips in place over pfannenstiel skin incision.  Pelvis/Vagina - Minimal rubra lochia  Extremities - No calf tenderness, no swelling    Labs:                        9.2    10.49 )-----------( 118      ( 07 May 2022 16:00 )             27.2                         8.8    10.54 )-----------( 108      ( 07 May 2022 04:30 )             26.2     Antibody Screen: NEG (22 @ 06:30)    MEDICATIONS  (STANDING):  acetaminophen     Tablet .. 975 milliGRAM(s) Oral <User Schedule>  calcium carbonate    500 mG (Tums) Chewable 1 Tablet(s) Chew every 6 hours  diphtheria/tetanus/pertussis (acellular) Vaccine (ADAcel) 0.5 milliLiter(s) IntraMuscular once  enoxaparin Injectable 40 milliGRAM(s) SubCutaneous every 24 hours  ibuprofen  Tablet. 600 milliGRAM(s) Oral every 6 hours  lactated ringers. 1000 milliLiter(s) (125 mL/Hr) IV Continuous <Continuous>  ondansetron Injectable 4 milliGRAM(s) IV Push once  senna 2 Tablet(s) Oral at bedtime  simethicone 80 milliGRAM(s) Chew every 4 hours    MEDICATIONS  (PRN):  diphenhydrAMINE 25 milliGRAM(s) Oral every 6 hours PRN Pruritus  lanolin Ointment 1 Application(s) Topical every 6 hours PRN Sore Nipples  magnesium hydroxide Suspension 30 milliLiter(s) Oral two times a day PRN Constipation  oxyCODONE    IR 5 milliGRAM(s) Oral every 3 hours PRN Moderate to Severe Pain (4-10)  oxyCODONE    IR 5 milliGRAM(s) Oral once PRN Moderate to Severe Pain (4-10)    
PGY1 Note:    Pt seen and evaluated at bedside. Pain well controlled. Denies dizziness/lightheadedness/CP/SOB/palpitations. Denies fever, chills, nausea/vomiting, diarrhea, dysuria, LE pain.     Ambulating: not yet OOB  Voiding: sawyer draining adequate urine  Flatus: no  Bowel movements: no BM   Breast or bottle feeding: breastfeeding   Diet: tolerating regular diet    UO: min 36cc/hr: (3335-1044) 50cc/50cc (0461-1605) 100cc (0647-3546) 400cc    Physical Exam  Vital Signs Last 24 Hrs  T(C): 35.7 (06 May 2022 15:20), Max: 36.6 (06 May 2022 05:31)  T(F): 96.2 (06 May 2022 15:20), Max: 97.9 (06 May 2022 05:31)  HR: 74 (06 May 2022 15:20) (73 - 95)  BP: 92/53 (06 May 2022 15:20) (91/55 - 110/63)  RR: 18 (06 May 2022 15:20) (18 - 18)  SpO2: 98% (06 May 2022 13:34) (93% - 100%)    Gen: AAOx3, NAD  Heart: RRR, S1S2+  Lungs: CTA B/L, no r/r/w   Fundus: firm, below umbilicus   Wound: Abdominal dressing, no serosanguinous drainage   Abd: Soft, nontender, nondistended, BS+  Lochia: minimal   Ext: No calf tenderness, no swelling    Labs:                        11.2   6.92  )-----------( 163      ( 06 May 2022 06:30 )             33.3        MEDICATIONS  (STANDING):  acetaminophen     Tablet .. 975 milliGRAM(s) Oral <User Schedule>  diphtheria/tetanus/pertussis (acellular) Vaccine (ADAcel) 0.5 milliLiter(s) IntraMuscular once  enoxaparin Injectable 40 milliGRAM(s) SubCutaneous every 24 hours  ibuprofen  Tablet. 600 milliGRAM(s) Oral every 6 hours  ketorolac   Injectable 30 milliGRAM(s) IV Push every 6 hours  lactated ringers. 1000 milliLiter(s) (125 mL/Hr) IV Continuous <Continuous>  oxytocin Infusion 333.333 milliUNIT(s)/Min (1000 mL/Hr) IV Continuous <Continuous>  oxytocin Infusion 333.333 milliUNIT(s)/Min (1000 mL/Hr) IV Continuous <Continuous>  senna 2 Tablet(s) Oral at bedtime  simethicone 80 milliGRAM(s) Chew every 4 hours    MEDICATIONS  (PRN):  diphenhydrAMINE 25 milliGRAM(s) Oral every 6 hours PRN Pruritus  lanolin Ointment 1 Application(s) Topical every 6 hours PRN Sore Nipples  magnesium hydroxide Suspension 30 milliLiter(s) Oral two times a day PRN Constipation  oxyCODONE    IR 5 milliGRAM(s) Oral every 3 hours PRN Moderate to Severe Pain (4-10)  oxyCODONE    IR 5 milliGRAM(s) Oral once PRN Moderate to Severe Pain (4-10)  
ANA MOLINA  32y  Female  CC: Postpartum  PGY2 Note:  Patient seen and examined bedside. No overnight events. Denies heavy vaginal bleeding and reports pain well controlled. Has been OOB to chair without difficulty. Tolerating regular diet. Reports +flatus. Denies dizziness, lightheadedness, SOB, or palpitations. Denies fever, chills, nausea, vomiting.     PAST MEDICAL & SURGICAL HISTORY:  Antiphospholipid syndrome    Anxiety    History of D&amp;C  x1    History of cerclage, currently pregnant        Physical Exam  Vital Signs Last 24 Hrs  T(C): 36.3 (07 May 2022 03:38), Max: 36.6 (06 May 2022 08:00)  T(F): 97.4 (07 May 2022 03:38), Max: 97.5 (06 May 2022 19:50)  HR: 85 (07 May 2022 03:38) (73 - 95)  BP: 90/57 (07 May 2022 03:38) (90/57 - 130/69)  RR: 18 (07 May 2022 03:38) (18 - 18)  SpO2: 98% (06 May 2022 13:34) (93% - 100%)    Gen: AAOx3, NAD  CV: RRR. No murmors gallops or rubs.  Pulm: CTAB. No wheezes or rales.  Ext: No calf tenderness, no swelling.   Abd: Soft, nontender, nondistended, BS+  Fundus firm, and below umbilicus. Nontender.   Lochia: Minimal, rubra  Wound: Pfannenstiel incision clean, dry intact. Steris in place. No surrounding edema or erythema.     Urine output: 5226-4495 900cc    Labs:                        11.2   6.92  )-----------( 163      ( 06 May 2022 06:30 )             33.3        MEDICATIONS  (STANDING):  acetaminophen     Tablet .. 975 milliGRAM(s) Oral <User Schedule>  diphtheria/tetanus/pertussis (acellular) Vaccine (ADAcel) 0.5 milliLiter(s) IntraMuscular once  enoxaparin Injectable 40 milliGRAM(s) SubCutaneous every 24 hours  ibuprofen  Tablet. 600 milliGRAM(s) Oral every 6 hours  lactated ringers. 1000 milliLiter(s) (125 mL/Hr) IV Continuous <Continuous>  oxytocin Infusion 333.333 milliUNIT(s)/Min (1000 mL/Hr) IV Continuous <Continuous>  oxytocin Infusion 333.333 milliUNIT(s)/Min (1000 mL/Hr) IV Continuous <Continuous>  senna 2 Tablet(s) Oral at bedtime  simethicone 80 milliGRAM(s) Chew every 4 hours    MEDICATIONS  (PRN):  diphenhydrAMINE 25 milliGRAM(s) Oral every 6 hours PRN Pruritus  lanolin Ointment 1 Application(s) Topical every 6 hours PRN Sore Nipples  magnesium hydroxide Suspension 30 milliLiter(s) Oral two times a day PRN Constipation  oxyCODONE    IR 5 milliGRAM(s) Oral every 3 hours PRN Moderate to Severe Pain (4-10)  oxyCODONE    IR 5 milliGRAM(s) Oral once PRN Moderate to Severe Pain (4-10)

## 2022-05-08 NOTE — DISCHARGE NOTE OB - NS MD DC FALL RISK RISK
For information on Fall & Injury Prevention, visit: [email or web address] OR  [email or web address] OR  https://www.cdc.gov/steadi/patient.html

## 2022-05-08 NOTE — DISCHARGE NOTE OB - HOSPITAL COURSE
Pt admitted for scheduled primary C/S, uncomplicated delivery and postop course, discharged in stable condition

## 2022-05-08 NOTE — DISCHARGE NOTE OB - CARE PROVIDER_API CALL
Joseph Mendoza  OBSTETRICS AND GYNECOLOGY  [address] [place] [address]  Phone: [phone]  Fax: [phone]  Follow Up Time: 1 week

## 2022-05-08 NOTE — DISCHARGE NOTE OB - PATIENT PORTAL LINK FT
You can access the FollowMyHealth Patient Portal offered by [facility] by registering at the following website: [email or web address]. By joining [employer]’s FollowMyHealth portal, you will also be able to view your health information using other applications (apps) compatible with our system.

## 2022-05-08 NOTE — DISCHARGE NOTE OB - PLAN OF CARE
Keep incisions clean and dry. No heavy lifting x4 weeks. Nothing in the vagina for 6 weeks - no sex, tampons, douching, tub baths or pools. May Shower. If you have a fever over 100.4F, severe pain or severe bleeding, please call your doctor or visit the emergency room. Follow up in 1 week for incision check and 6 weeks for postpartum check with your doctor. Continue with Lovenox 40mg daily for 6 weeks postpartum

## 2022-05-08 NOTE — DISCHARGE NOTE OB - MEDICATION SUMMARY - MEDICATIONS TO TAKE
I will START or STAY ON the medications listed below when I get home from the hospital:    ibuprofen 600 mg oral tablet  -- 1 tab(s) by mouth every 6 hours, As Needed   -- Indication: For pain    acetaminophen 325 mg oral tablet  -- 3 tab(s) by mouth every 6 hours, As Needed  -- Indication: For pain    Lovenox 40 mg/0.4 mL injectable solution  -- 40 milligram(s) injectable once a day MDD:40 daily for 6 weeks  -- Indication: For Antiphospholipid     senna oral tablet  -- 2 tab(s) by mouth once a day (at bedtime), As Needed   -- Indication: For constipation    Colace 100 mg oral capsule  -- 1 cap(s) by mouth once a day, As Needed   -- Medication should be taken with plenty of water.    -- Indication: For constipation    simethicone 80 mg oral tablet, chewable  -- 1 tab(s) by mouth every 4 hours, As Needed   -- Indication: For gas

## 2022-05-10 ENCOUNTER — EMERGENCY (EMERGENCY)
Facility: HOSPITAL | Age: 32
LOS: 0 days | Discharge: HOME | End: 2022-05-11
Attending: EMERGENCY MEDICINE | Admitting: EMERGENCY MEDICINE
Payer: COMMERCIAL

## 2022-05-10 VITALS
SYSTOLIC BLOOD PRESSURE: 118 MMHG | OXYGEN SATURATION: 97 % | DIASTOLIC BLOOD PRESSURE: 59 MMHG | HEIGHT: 63 IN | RESPIRATION RATE: 18 BRPM | TEMPERATURE: 98 F | HEART RATE: 78 BPM

## 2022-05-10 DIAGNOSIS — Z98.890 OTHER SPECIFIED POSTPROCEDURAL STATES: Chronic | ICD-10-CM

## 2022-05-10 DIAGNOSIS — R10.13 EPIGASTRIC PAIN: ICD-10-CM

## 2022-05-10 DIAGNOSIS — Z86.2 PERSONAL HISTORY OF DISEASES OF THE BLOOD AND BLOOD-FORMING ORGANS AND CERTAIN DISORDERS INVOLVING THE IMMUNE MECHANISM: ICD-10-CM

## 2022-05-10 DIAGNOSIS — R07.2 PRECORDIAL PAIN: ICD-10-CM

## 2022-05-10 DIAGNOSIS — O09.299 SUPERVISION OF PREGNANCY WITH OTHER POOR REPRODUCTIVE OR OBSTETRIC HISTORY, UNSPECIFIED TRIMESTER: Chronic | ICD-10-CM

## 2022-05-10 DIAGNOSIS — R11.10 VOMITING, UNSPECIFIED: ICD-10-CM

## 2022-05-10 DIAGNOSIS — F41.9 ANXIETY DISORDER, UNSPECIFIED: ICD-10-CM

## 2022-05-10 LAB
ALBUMIN SERPL ELPH-MCNC: 3.5 G/DL — SIGNIFICANT CHANGE UP (ref 3.5–5.2)
ALP SERPL-CCNC: 97 U/L — SIGNIFICANT CHANGE UP (ref 30–115)
ALT FLD-CCNC: 42 U/L — HIGH (ref 0–41)
ANION GAP SERPL CALC-SCNC: 17 MMOL/L — HIGH (ref 7–14)
AST SERPL-CCNC: 40 U/L — SIGNIFICANT CHANGE UP (ref 0–41)
BASOPHILS # BLD AUTO: 0.01 K/UL — SIGNIFICANT CHANGE UP (ref 0–0.2)
BASOPHILS NFR BLD AUTO: 0.1 % — SIGNIFICANT CHANGE UP (ref 0–1)
BILIRUB SERPL-MCNC: 0.2 MG/DL — SIGNIFICANT CHANGE UP (ref 0.2–1.2)
BUN SERPL-MCNC: 10 MG/DL — SIGNIFICANT CHANGE UP (ref 10–20)
CALCIUM SERPL-MCNC: 9 MG/DL — SIGNIFICANT CHANGE UP (ref 8.5–10.1)
CHLORIDE SERPL-SCNC: 102 MMOL/L — SIGNIFICANT CHANGE UP (ref 98–110)
CO2 SERPL-SCNC: 19 MMOL/L — SIGNIFICANT CHANGE UP (ref 17–32)
CREAT SERPL-MCNC: <0.5 MG/DL — LOW (ref 0.7–1.5)
D DIMER BLD IA.RAPID-MCNC: 2047 NG/ML DDU — HIGH (ref 0–230)
EGFR: 136 ML/MIN/1.73M2 — SIGNIFICANT CHANGE UP
EOSINOPHIL # BLD AUTO: 0.12 K/UL — SIGNIFICANT CHANGE UP (ref 0–0.7)
EOSINOPHIL NFR BLD AUTO: 1.3 % — SIGNIFICANT CHANGE UP (ref 0–8)
GLUCOSE SERPL-MCNC: 72 MG/DL — SIGNIFICANT CHANGE UP (ref 70–99)
HCT VFR BLD CALC: 29.8 % — LOW (ref 37–47)
HGB BLD-MCNC: 10.1 G/DL — LOW (ref 12–16)
IMM GRANULOCYTES NFR BLD AUTO: 0.9 % — HIGH (ref 0.1–0.3)
LACTATE SERPL-SCNC: 0.9 MMOL/L — SIGNIFICANT CHANGE UP (ref 0.7–2)
LIDOCAIN IGE QN: 10 U/L — SIGNIFICANT CHANGE UP (ref 7–60)
LYMPHOCYTES # BLD AUTO: 1.51 K/UL — SIGNIFICANT CHANGE UP (ref 1.2–3.4)
LYMPHOCYTES # BLD AUTO: 16.3 % — LOW (ref 20.5–51.1)
MAGNESIUM SERPL-MCNC: 1.8 MG/DL — SIGNIFICANT CHANGE UP (ref 1.8–2.4)
MCHC RBC-ENTMCNC: 30.1 PG — SIGNIFICANT CHANGE UP (ref 27–31)
MCHC RBC-ENTMCNC: 33.9 G/DL — SIGNIFICANT CHANGE UP (ref 32–37)
MCV RBC AUTO: 88.7 FL — SIGNIFICANT CHANGE UP (ref 81–99)
MONOCYTES # BLD AUTO: 0.63 K/UL — HIGH (ref 0.1–0.6)
MONOCYTES NFR BLD AUTO: 6.8 % — SIGNIFICANT CHANGE UP (ref 1.7–9.3)
NEUTROPHILS # BLD AUTO: 6.92 K/UL — HIGH (ref 1.4–6.5)
NEUTROPHILS NFR BLD AUTO: 74.6 % — SIGNIFICANT CHANGE UP (ref 42.2–75.2)
NRBC # BLD: 0 /100 WBCS — SIGNIFICANT CHANGE UP (ref 0–0)
PLATELET # BLD AUTO: 171 K/UL — SIGNIFICANT CHANGE UP (ref 130–400)
POTASSIUM SERPL-MCNC: 3.8 MMOL/L — SIGNIFICANT CHANGE UP (ref 3.5–5)
POTASSIUM SERPL-SCNC: 3.8 MMOL/L — SIGNIFICANT CHANGE UP (ref 3.5–5)
PROT SERPL-MCNC: 5.9 G/DL — LOW (ref 6–8)
RBC # BLD: 3.36 M/UL — LOW (ref 4.2–5.4)
RBC # FLD: 14.2 % — SIGNIFICANT CHANGE UP (ref 11.5–14.5)
SODIUM SERPL-SCNC: 138 MMOL/L — SIGNIFICANT CHANGE UP (ref 135–146)
WBC # BLD: 9.27 K/UL — SIGNIFICANT CHANGE UP (ref 4.8–10.8)
WBC # FLD AUTO: 9.27 K/UL — SIGNIFICANT CHANGE UP (ref 4.8–10.8)

## 2022-05-10 PROCEDURE — 93010 ELECTROCARDIOGRAM REPORT: CPT

## 2022-05-10 PROCEDURE — 74177 CT ABD & PELVIS W/CONTRAST: CPT | Mod: 26,MA

## 2022-05-10 PROCEDURE — 76705 ECHO EXAM OF ABDOMEN: CPT | Mod: 26

## 2022-05-10 PROCEDURE — 76830 TRANSVAGINAL US NON-OB: CPT | Mod: 26

## 2022-05-10 PROCEDURE — 93970 EXTREMITY STUDY: CPT | Mod: 26

## 2022-05-10 PROCEDURE — 71045 X-RAY EXAM CHEST 1 VIEW: CPT | Mod: 26

## 2022-05-10 PROCEDURE — 99285 EMERGENCY DEPT VISIT HI MDM: CPT

## 2022-05-10 PROCEDURE — 71275 CT ANGIOGRAPHY CHEST: CPT | Mod: 26,MA

## 2022-05-10 RX ORDER — FAMOTIDINE 10 MG/ML
1 INJECTION INTRAVENOUS
Qty: 7 | Refills: 0
Start: 2022-05-10 | End: 2022-05-16

## 2022-05-10 RX ORDER — FAMOTIDINE 10 MG/ML
20 INJECTION INTRAVENOUS ONCE
Refills: 0 | Status: COMPLETED | OUTPATIENT
Start: 2022-05-10 | End: 2022-05-10

## 2022-05-10 RX ORDER — SODIUM CHLORIDE 9 MG/ML
1000 INJECTION, SOLUTION INTRAVENOUS ONCE
Refills: 0 | Status: COMPLETED | OUTPATIENT
Start: 2022-05-10 | End: 2022-05-10

## 2022-05-10 RX ORDER — ONDANSETRON 8 MG/1
4 TABLET, FILM COATED ORAL ONCE
Refills: 0 | Status: COMPLETED | OUTPATIENT
Start: 2022-05-10 | End: 2022-05-10

## 2022-05-10 RX ORDER — ONDANSETRON 8 MG/1
1 TABLET, FILM COATED ORAL
Qty: 6 | Refills: 0
Start: 2022-05-10 | End: 2022-05-11

## 2022-05-10 RX ORDER — DIPHENHYDRAMINE HYDROCHLORIDE AND LIDOCAINE HYDROCHLORIDE AND ALUMINUM HYDROXIDE AND MAGNESIUM HYDRO
30 KIT ONCE
Refills: 0 | Status: COMPLETED | OUTPATIENT
Start: 2022-05-10 | End: 2022-05-10

## 2022-05-10 RX ADMIN — SODIUM CHLORIDE 1000 MILLILITER(S): 9 INJECTION, SOLUTION INTRAVENOUS at 16:05

## 2022-05-10 RX ADMIN — ONDANSETRON 4 MILLIGRAM(S): 8 TABLET, FILM COATED ORAL at 16:05

## 2022-05-10 RX ADMIN — DIPHENHYDRAMINE HYDROCHLORIDE AND LIDOCAINE HYDROCHLORIDE AND ALUMINUM HYDROXIDE AND MAGNESIUM HYDRO 30 MILLILITER(S): KIT at 16:52

## 2022-05-10 RX ADMIN — FAMOTIDINE 20 MILLIGRAM(S): 10 INJECTION INTRAVENOUS at 17:23

## 2022-05-10 NOTE — ED PROVIDER NOTE - OBJECTIVE STATEMENT
32 F pmhx of antiphospholipid syndrome on lovenox presents to ED for constant sharp substernal/ epigastric pain radiant straight through to the back that started 4 days ago with associated bilous vomiting. pt had c section on Friday and the next day developed the pain. pt was instructed to take tums for the pain which have not helped. [place] [facility] E[place] and CXR which were negative. pt denies SOB, smoking, HA, fever, chills, diarrhea, hx of PE or DVT

## 2022-05-10 NOTE — ED ADULT NURSE NOTE - OBJECTIVE STATEMENT
Patient c/o chest pain radiating to back since last night. Patient also c/o nausea with abdominal pain, patient recently had C-cestion with d/c from hospital on Sunday. On assessment abdomen soft, nontender. Denies fever, chills, SOB, diaphoresis.

## 2022-05-10 NOTE — ED PROVIDER NOTE - NS ED ATTENDING STATEMENT MOD
This was a shared visit with the ROMANA. I reviewed and verified the documentation and independently performed the documented:

## 2022-05-10 NOTE — ED PROVIDER NOTE - CARE PROVIDER_API CALL
Dorothea Campbell  GASTROENTEROLOGY  [address] [place] [address]  Phone: [phone]  Fax: [phone]  Follow Up Time:

## 2022-05-10 NOTE — CONSULT NOTE ADULT - SUBJECTIVE AND OBJECTIVE BOX
PGY 1 Note:    Chief Complaint: vomiting, epigastric pain    HPI: 33 yo P1 POD 4 s/p elective primary  on 22 reports epigastric pain since Saturday. She states the pain radiates to her back. The pain is now 6/10 intensity and is sharp. She tried tums and tylenol for the pain with moderate improvement. She is not taking the simethicone or senna that she was previously discharged with. She states she also began vomiting Saturday. She is able to tolerate PO but, vomits every 5 hours. She is passing gas and urinating without difficulty. She endorses mild vaginal bleeding, not saturating any pads. She denies sick contacts or travel. She denies malaise dizziness, lightheadedness, fevers, chills, SOB, CP, diarrhea, constipation, dysuria, urinary urgency or urinary frequency.    Ob/Gyn History:  P1                 Denies history of ovarian cysts, uterine fibroids, abnormal paps, or STIs    Home Medications:  acetaminophen 325 mg oral tablet: 3 tab(s) orally every 6 hours, As Needed (08 May 2022 08:05)    Allergies    No Known Allergies    Intolerances    PAST MEDICAL & SURGICAL HISTORY:  Antiphospholipid syndrome    Anxiety    History of D&amp;C  x1    History of cerclage, currently pregnant    FAMILY HISTORY:  Family history of breast cancer (Aunt)  x2 maternal aunts, around 50 years old    FH: hypertension (Father)    FHx: diabetes mellitus (Father)    FH: colon cancer (Grandparent)    SOCIAL HISTORY: Denies cigarette use, alcohol use, or illicit drug use    Vital Signs Last 24 Hrs  T(F): 97.5 (10 May 2022 14:08), Max: 97.5 (10 May 2022 14:08)  HR: 78 (10 May 2022 14:08) (78 - 78)  BP: 118/59 (10 May 2022 14:08) (118/59 - 118/59)  RR: 18 (10 May 2022 14:08) (18 - 18)  Height (cm): 160 (05-10-22 @ 14:08)    General Appearance - AAOx3, NAD  Heart - S1S2 regular rate and rhythm  Lung - CTA Bilaterally  Abdomen - Soft, nontender, nondistended, no rebound, no rigidity, no guarding, bowel sounds present.   Incision: c/d/I, no erythema, no drainage    GYN/Pelvis: deferred    Meds:   famotidine Injectable 20 milliGRAM(s) IV Push once  FIRST- Mouthwash  BLM 30 milliLiter(s) Swish and Swallow Once  lactated ringers Bolus 1000 milliLiter(s) IV Bolus once  ondansetron Injectable 4 milliGRAM(s) IV Push once    Height (cm): 160 (05-10-22 @ 14:08)    LABS:                        10.1   9.27  )-----------( 171      ( 10 May 2022 16:14 )             29.8       05-10    138  |  102  |  10  ----------------------------<  72  3.8   |  19  |  <0.5<L>    Ca    9.0      10 May 2022 16:14  Mg     1.8     05-10    TPro  5.9<L>  /  Alb  3.5  /  TBili  0.2  /  DBili  x   /  AST  40  /  ALT  42<H>  /  AlkPhos  97  05-10      RADIOLOGY & ADDITIONAL STUDIES:    < from: US Transvaginal (05.10.22 @ 22:06) >  US TRANSVAGINAL                          PROCEDURE DATE:  05/10/2022          INTERPRETATION:  CLINICAL INFORMATION: Post  section, 5 days   prior. Evaluate for retained products of conception.    COMPARISON: Pelvic ultrasound 2021. CT abdomen and pelvis   May 10, 2022.    TECHNIQUE:  Endovaginal and transabdominal pelvic sonogram. Color and Spectral   Doppler was performed.    FINDINGS:  Uterus: 14.7 cm x 7.9 cm x 9.9 cm.  Abnormally thickened endometrium at 2.8 cm, without significant increased   Doppler flow.    Right ovary: 3.0 cm x 1.0 cm x 1.5 cm.  Left ovary: 2.1 cm x 1.1 cm x 1.8 cm.  Bilateral ovarian Doppler flow demonstrated.    Trace free pelvic fluid.    IMPRESSION:  Abnormally thickened endometrium at 2.8 cm, without significant increased   Doppler flow, which is greater than typical recent postpartum (<2cm).   Avascular retained products of conception cannot be excluded. Continued   follow-up recommended.    --- End of Report ---            DEON NEELY MD; Attending Radiologist  This document has been electronically signed. May 10 2022 10:27PM    < end of copied text >     PGY 1 Note:    Chief Complaint: vomiting, epigastric pain    HPI: 33 yo P1 POD 4 s/p elective primary  on 22 reports epigastric pain and vomiting for 4 days. She states the pain radiates to her back. The pain is now 6/10 intensity and is sharp. She tried tums and tylenol for the pain with moderate improvement. She is able to tolerate PO. She is passing gas and urinating without difficulty. She endorses mild vaginal bleeding, not saturating any pads. She denies sick contacts or travel. She denies malaise dizziness, lightheadedness, fevers, chills, SOB, CP, diarrhea, constipation, dysuria, urinary urgency or urinary frequency.    Ob/Gyn History:  P1 - LTCS on 2022, uncomplicated                 Denies history of ovarian cysts, uterine fibroids, abnormal paps, or STIs    Home Medications:  acetaminophen 325 mg oral tablet: 3 tab(s) orally every 6 hours, As Needed (08 May 2022 08:05)    Allergies    No Known Allergies    Intolerances    PAST MEDICAL & SURGICAL HISTORY:  Antiphospholipid syndrome    Anxiety    History of D&amp;C  x1    History of cerclage, currently pregnant    FAMILY HISTORY:  Family history of breast cancer (Aunt)  x2 maternal aunts, around 50 years old    FH: hypertension (Father)    FHx: diabetes mellitus (Father)    FH: colon cancer (Grandparent)    SOCIAL HISTORY: Denies cigarette use, alcohol use, or illicit drug use    Vital Signs Last 24 Hrs  T(F): 97.5 (10 May 2022 14:08), Max: 97.5 (10 May 2022 14:08)  HR: 78 (10 May 2022 14:08) (78 - 78)  BP: 118/59 (10 May 2022 14:08) (118/59 - 118/59)  RR: 18 (10 May 2022 14:08) (18 - 18)  Height (cm): 160 (05-10-22 @ 14:08)    General Appearance - AAOx3, NAD  Heart - S1S2 regular rate and rhythm  Lung - CTA Bilaterally  Abdomen - Soft, nontender, nondistended, no rebound, no rigidity, no guarding, bowel sounds present.   Incision: c/d/I, no erythema, no drainage    GYN/Pelvis: deferred    Meds:   famotidine Injectable 20 milliGRAM(s) IV Push once  FIRST- Mouthwash  BLM 30 milliLiter(s) Swish and Swallow Once  lactated ringers Bolus 1000 milliLiter(s) IV Bolus once  ondansetron Injectable 4 milliGRAM(s) IV Push once    Height (cm): 160 (05-10-22 @ 14:08)    LABS:                        10.1   9.27  )-----------( 171      ( 10 May 2022 16:14 )             29.8       05-10    138  |  102  |  10  ----------------------------<  72  3.8   |  19  |  <0.5<L>    Ca    9.0      10 May 2022 16:14  Mg     1.8     05-10    TPro  5.9<L>  /  Alb  3.5  /  TBili  0.2  /  DBili  x   /  AST  40  /  ALT  42<H>  /  AlkPhos  97  05-10      RADIOLOGY & ADDITIONAL STUDIES:    < from: US Transvaginal (05.10.22 @ 22:06) >  US TRANSVAGINAL                          PROCEDURE DATE:  05/10/2022          INTERPRETATION:  CLINICAL INFORMATION: Post  section, 5 days   prior. Evaluate for retained products of conception.    COMPARISON: Pelvic ultrasound 2021. CT abdomen and pelvis   May 10, 2022.    TECHNIQUE:  Endovaginal and transabdominal pelvic sonogram. Color and Spectral   Doppler was performed.    FINDINGS:  Uterus: 14.7 cm x 7.9 cm x 9.9 cm.  Abnormally thickened endometrium at 2.8 cm, without significant increased   Doppler flow.    Right ovary: 3.0 cm x 1.0 cm x 1.5 cm.  Left ovary: 2.1 cm x 1.1 cm x 1.8 cm.  Bilateral ovarian Doppler flow demonstrated.    Trace free pelvic fluid.    IMPRESSION:  Abnormally thickened endometrium at 2.8 cm, without significant increased   Doppler flow, which is greater than typical recent postpartum (<2cm).   Avascular retained products of conception cannot be excluded. Continued   follow-up recommended.    --- End of Report ---            DEON NEELY MD; Attending Radiologist  This document has been electronically signed. May 10 2022 10:27PM    < end of copied text >

## 2022-05-10 NOTE — ED PROVIDER NOTE - PATIENT PORTAL LINK FT
You can access the FollowMyHealth Patient Portal offered by [facility] by registering at the following website: [email or web address]. By joining [employer]’s FollowMyHealth portal, you will also be able to view your health information using other applications (apps) compatible with our system.

## 2022-05-10 NOTE — ED PROVIDER NOTE - PHYSICAL EXAMINATION
VITAL SIGNS: I have reviewed nursing notes and confirm.  CONSTITUTIONAL:  in no acute distress.  SKIN: Skin exam is warm and dry, no acute rash.  HEAD: Normocephalic; atraumatic.  EYES:  EOM intact; conjunctiva and sclera clear.  ENT: No nasal discharge; airway clear.   CARD: S1, S2 normal; no murmurs, gallops, or rubs. Regular rate and rhythm.  RESP: No wheezes, rales or rhonchi. Speaking in full sentences.   ABD: Normal bowel sounds; soft; non-distended; ttp of RUQ and epigastric region; No rebound or guarding. No CVA tenderness.  EXT: Normal ROM.  edema.  NEURO: Alert, oriented. Grossly unremarkable. No focal deficits.

## 2022-05-10 NOTE — ED PROVIDER NOTE - NS ED MD DISPO DISCHARGE CCDA
Patient/Caregiver provided printed discharge information. Bexarotene Pregnancy And Lactation Text: This medication is Pregnancy Category X and should not be given to women who are pregnant or may become pregnant. This medication should not be used if you are breast feeding.

## 2022-05-10 NOTE — ED PROVIDER NOTE - NS ED ROS FT
Review of Systems  Constitutional:  No fever, chills, malaise, generalized weakness.  Eyes:  No visual changes,   ENMT:  No hearing changes, No throat pain, swelling, or difficulty swallowing.  Cardiac:  + chest pain, palpitations, syncope, or edema.  Respiratory:  No dyspnea, or cough. No hemoptysis.  GI:  + nausea, vomiting, - diarrhea, epigastric abdominal pain.  :  No dysuria  MS:  No myalgia, + back pain.  Neuro:  No headache, dizziness

## 2022-05-12 DIAGNOSIS — F41.9 ANXIETY DISORDER, UNSPECIFIED: ICD-10-CM

## 2022-05-12 DIAGNOSIS — D68.61 ANTIPHOSPHOLIPID SYNDROME: ICD-10-CM

## 2022-05-12 DIAGNOSIS — O34.33 MATERNAL CARE FOR CERVICAL INCOMPETENCE, THIRD TRIMESTER: ICD-10-CM

## 2022-05-12 DIAGNOSIS — Z3A.39 39 WEEKS GESTATION OF PREGNANCY: ICD-10-CM

## 2022-11-19 NOTE — OB RN TRIAGE NOTE - DOMESTIC TRAVEL HIGH RISK QUESTION
History  Chief Complaint   Patient presents with   • Cough     Pt c/o cough since last week, reports cough got worse last night, denies fevers     Pt presents to the ED with a cough x 8 days, lives with grand kids and they have been sick  Pt states she just isnt getting over it and feels cough is worse and hurts to cough  Subjective fevers initially - no fevers now  Prior to Admission Medications   Prescriptions Last Dose Informant Patient Reported? Taking?    NON FORMULARY   Yes No   Sig: Medical marijuana drops prn   acetaminophen (Tylenol) 325 mg tablet   Yes No   Sig: Take 650 mg by mouth every 6 (six) hours as needed for mild pain   cholecalciferol (VITAMIN D3) 1,000 units tablet   Yes No   Sig: Take 1,000 Units by mouth daily   ibuprofen (MOTRIN) 200 mg tablet   Yes No   Sig: Take 200-800 mg by mouth every 6 (six) hours as needed for mild pain   losartan (COZAAR) 50 mg tablet   Yes Yes   Sig: losartan 50 mg tablet   TAKE 1 TABLET BY MOUTH EVERY DAY      Facility-Administered Medications: None       Past Medical History:   Diagnosis Date   • Anesthesia complication     aggitation   • Anxiety    • Arthritis    • Hearing loss     hearing  aids prn   • History of lumbar fusion    • Kidney stones    • Peripheral neuropathy    • Psoriasis     right elbow   • SCC (squamous cell carcinoma)     chest   • Use of cane as ambulatory aid     or walker prn       Past Surgical History:   Procedure Laterality Date   • BACK SURGERY      lumbar fusion   • CARPAL TUNNEL RELEASE Bilateral    • CATARACT EXTRACTION Bilateral    • CHEST WALL BIOPSY N/A 10/9/2020    Procedure: CHEST SCC EXCISION; LOCAL FLAP;  Surgeon: Pepe Gonzalez MD;  Location: [facility] OR;  Service: Plastics   • COLONOSCOPY     • FOOT SURGERY Left     fusion   • LAMINECTOMY     • ME EXPLORE PARATHYROID GLANDS Right 10/3/2019    Procedure: MINIMALLY INVASIVE RIGHT PARATHYROIDECTOMY,  INRAOPERATIVE PTH MONITORING;  Surgeon: Sona Dixon MD;  Location: [facility];  Service: Surgical Oncology   • ULNAR NERVE TRANSPOSITION     • UTERINE FIBROID SURGERY         History reviewed  No pertinent family history  I have reviewed and agree with the history as documented  E-Cigarette/Vaping   • E-Cigarette Use Never User      E-Cigarette/Vaping Substances   • Nicotine No    • THC No    • CBD No    • Flavoring No    • Other No    • Unknown No      Social History     Tobacco Use   • Smoking status: Some Days     Types: Cigarettes   • Smokeless tobacco: Never   • Tobacco comments:     social smoker- smoked regularly yrs ago   Vaping Use   • Vaping Use: Never used   Substance Use Topics   • Alcohol use: Yes     Comment: wine   • Drug use: No       Review of Systems   Constitutional: Positive for fever  HENT: Positive for congestion  Respiratory: Positive for cough  Negative for shortness of breath, wheezing and stridor  Cardiovascular: Positive for chest pain  Negative for leg swelling  Gastrointestinal: Negative  Neurological: Positive for headaches  Negative for dizziness  All other systems reviewed and are negative  Physical Exam  Physical Exam  Vitals and nursing note reviewed  Constitutional:       Appearance: She is well-developed and well-nourished  HENT:      Head: Normocephalic and atraumatic  Right Ear: Tympanic membrane and external ear normal       Left Ear: Tympanic membrane and external ear normal       Mouth/Throat:      Mouth: Oropharynx is clear and moist    Eyes:      Extraocular Movements: EOM normal       Conjunctiva/sclera: Conjunctivae normal    Cardiovascular:      Rate and Rhythm: Normal rate and regular rhythm  Pulses: Intact distal pulses  Heart sounds: Normal heart sounds  Pulmonary:      Effort: Pulmonary effort is normal       Breath sounds: Normal breath sounds  No wheezing  Abdominal:      General: Bowel sounds are normal       Palpations: Abdomen is soft     Musculoskeletal:         General: Normal range of motion  Cervical back: Neck supple  Lymphadenopathy:      Cervical: No cervical adenopathy  Skin:     General: Skin is warm  Findings: No rash  Neurological:      Mental Status: She is alert  Cranial Nerves: No cranial nerve deficit  Motor: No weakness  Gait: Gait normal    Psychiatric:         Mood and Affect: Mood and affect and mood normal          Behavior: Behavior normal          Vital Signs  ED Triage Vitals   Temperature Pulse Respirations Blood Pressure SpO2   11/19/22 0547 11/19/22 0547 11/19/22 0547 11/19/22 0547 11/19/22 0547   98 6 °F (37 °C) 88 18 (!) 191/84 96 %      Temp Source Heart Rate Source Patient Position - Orthostatic VS BP Location FiO2 (%)   11/19/22 0547 11/19/22 0547 11/19/22 0547 11/19/22 0547 --   Oral Monitor Sitting Right arm       Pain Score       11/19/22 0700       5           Vitals:    11/19/22 0630 11/19/22 0700 11/19/22 0859 11/19/22 0939   BP: 156/74 (!) 182/81 168/75 144/68   Pulse:  72 86 84   Patient Position - Orthostatic VS: Lying Lying Lying Lying         Visual Acuity      ED Medications  Medications   acetaminophen (TYLENOL) tablet 975 mg (975 mg Oral Given 11/19/22 0710)       Diagnostic Studies  Results Reviewed     Procedure Component Value Units Date/Time    HS Troponin I 2hr [[id number]]  (Normal) Collected: 11/19/22 0849    Lab Status: Final result Specimen: Blood from Arm, Left Updated: 11/19/22 0909     hs TnI 2hr 6 ng/L      Delta 2hr hsTnI 0 ng/L     FLU/RSV/COVID - if FLU/RSV clinically relevant [[id number]]  (Abnormal) Collected: 11/19/22 0637    Lab Status: Final result Specimen: Nares from Nose Updated: 11/19/22 0735     SARS-CoV-2 Negative     INFLUENZA A PCR Positive     INFLUENZA B PCR Negative     RSV PCR Negative    Narrative:      FOR PEDIATRIC PATIENTS - copy/paste COVID Guidelines URL to browser: [email or web address]    SARS-CoV-2 assay is a Nucleic Acid Amplification assay intended for the  qualitative detection of nucleic acid from SARS-CoV-2 in nasopharyngeal  swabs  Results are for the presumptive identification of SARS-CoV-2 RNA  Positive results are indicative of infection with SARS-CoV-2, the virus  causing COVID-19, but do not rule out bacterial infection or co-infection  with other viruses  Laboratories within the [place] and its  territories are required to report all positive results to the appropriate  public health authorities  Negative results do not preclude SARS-CoV-2  infection and should not be used as the sole basis for treatment or other  patient management decisions  Negative results must be combined with  clinical observations, patient history, and epidemiological information  This test has not been FDA cleared or approved  This test has been authorized by FDA under an Emergency Use Authorization  (EUA)  This test is only authorized for the duration of time the  declaration that circumstances exist justifying the authorization of the  emergency use of an in vitro diagnostic tests for detection of SARS-CoV-2  virus and/or diagnosis of COVID-19 infection under section 564(b)(1) of  the Act, 21 U  S C  193IYD-2(C)(6), unless the authorization is terminated  or revoked sooner  The test has been validated but independent review by FDA  and CLIA is pending  Test performed using [employer] GeneXpert: This RT-PCR assay targets N2,  a region unique to SARS-CoV-2  A conserved region in the E-gene was chosen  for pan-Sarbecovirus detection which includes SARS-CoV-2  According to CMS-2020-01-R, this platform meets the definition of high-throughput technology      HS Troponin 0hr (reflex protocol) [[id number]]  (Normal) Collected: 11/19/22 0637    Lab Status: Final result Specimen: Blood from Arm, Left Updated: 11/19/22 0710     hs TnI 0hr 6 ng/L     Comprehensive metabolic panel [[id number]]  (Abnormal) Collected: 11/19/22 0637    Lab Status: Final result Specimen: Blood from Arm, Left Updated: 11/19/22 0706     Sodium 139 mmol/L      Potassium 4 0 mmol/L      Chloride 105 mmol/L      CO2 26 mmol/L      ANION GAP 8 mmol/L      BUN 14 mg/dL      Creatinine 0 53 mg/dL      Glucose 122 mg/dL      Calcium 8 5 mg/dL      Corrected Calcium 9 5 mg/dL      AST 26 U/L      ALT 39 U/L      Alkaline Phosphatase 125 U/L      Total Protein 6 7 g/dL      Albumin 2 8 g/dL      Total Bilirubin 0 62 mg/dL      eGFR 92 ml/min/1 73sq m     Narrative:      National Kidney Disease Foundation guidelines for Chronic Kidney Disease (CKD):   •  Stage 1 with normal or high GFR (GFR > 90 mL/min/1 73 square meters)  •  Stage 2 Mild CKD (GFR = 60-89 mL/min/1 73 square meters)  •  Stage 3A Moderate CKD (GFR = 45-59 mL/min/1 73 square meters)  •  Stage 3B Moderate CKD (GFR = 30-44 mL/min/1 73 square meters)  •  Stage 4 Severe CKD (GFR = 15-29 mL/min/1 73 square meters)  •  Stage 5 End Stage CKD (GFR <15 mL/min/1 73 square meters)  Note: GFR calculation is accurate only with a steady state creatinine    Protime-INR [[id number]]  (Normal) Collected: 11/19/22 0637    Lab Status: Final result Specimen: Blood from Arm, Left Updated: 11/19/22 0701     Protime 13 0 seconds      INR 0 98    APTT [[id number]]  (Normal) Collected: 11/19/22 0637    Lab Status: Final result Specimen: Blood from Arm, Left Updated: 11/19/22 0701     PTT 25 seconds     CBC and differential [[id number]]  (Abnormal) Collected: 11/19/22 0637    Lab Status: Final result Specimen: Blood from Arm, Left Updated: 11/19/22 0644     WBC 6 42 Thousand/uL      RBC 3 93 Million/uL      Hemoglobin 11 7 g/dL      Hematocrit 34 5 %      MCV 88 fL      MCH 29 8 pg      MCHC 33 9 g/dL      RDW 11 8 %      MPV 9 3 fL      Platelets 758 Thousands/uL      nRBC 0 /100 WBCs      Neutrophils Relative 78 %      Immat GRANS % 0 %      Lymphocytes Relative 13 %      Monocytes Relative 8 %      Eosinophils Relative 1 %      Basophils Relative 0 % Neutrophils Absolute 4 96 Thousands/µL      Immature Grans Absolute 0 02 Thousand/uL      Lymphocytes Absolute 0 84 Thousands/µL      Monocytes Absolute 0 50 Thousand/µL      Eosinophils Absolute 0 09 Thousand/µL      Basophils Absolute 0 01 Thousands/µL                  XR chest 2 views   ED Interpretation by Jasson Santo PA-C (11/19 3263)   Possible consolidation RLL      Final Result by Varghese Aragon MD (11/19 7876)      Infiltrate or atelectasis at the left lung base  Workstation performed: [id number]                    Procedures  ECG 12 Lead Documentation Only    Date/Time: 11/19/2022 7:10 AM  Performed by: Jasson Santo PA-C  Authorized by: Jasson Santo PA-C     Indications / Diagnosis:  Cp  Patient location:  ED  Previous ECG:     Previous ECG:  Compared to current    Comparison ECG info:   Oct 5, 2020    Similarity:  No change  Rate:     ECG rate:  76    ECG rate assessment: normal    Rhythm:     Rhythm: sinus rhythm    Ectopy:     Ectopy: none    QRS:     QRS axis:  Normal  Conduction:     Conduction: normal    ST segments:     ST segments:  Normal  T waves:     T waves: normal      ECG 12 Lead Documentation Only    Date/Time: 11/19/2022 8:56 AM  Performed by: Jasson Santo PA-C  Authorized by: Jasson Santo PA-C     Indications / Diagnosis:  2 hr repeat  Patient location:  ED  Previous ECG:     Previous ECG:  Compared to current    Comparison ECG info:  2 hr prior    Similarity:  No change  Rate:     ECG rate:  85    ECG rate assessment: normal    Rhythm:     Rhythm: sinus rhythm    Ectopy:     Ectopy: none    QRS:     QRS axis:  Normal  Conduction:     Conduction: normal    ST segments:     ST segments:  Normal  T waves:     T waves: normal               ED Course             HEART Risk Score    Flowsheet Row Most Recent Value   Heart Score Risk Calculator    History 0 Filed at: 11/19/2022 1006   ECG 0 Filed at: 11/19/2022 1006   Age 2 Filed at: 11/19/2022 1006   Risk Factors 1 Filed at: 11/19/2022 1006   Troponin 0 Filed at: 11/19/2022 1006   HEART Score 3 Filed at: 11/19/2022 1006                        SBIRT 20yo+    Flowsheet Row Most Recent Value   SBIRT (25 yo +)    In order to provide better care to our patients, we are screening all of our patients for alcohol and drug use  Would it be okay to ask you these screening questions? No Filed at: 11/19/2022 0551                    MDM  Number of Diagnoses or Management Options  Influenza A: new and requires workup  Pneumonia: new and requires workup     Amount and/or Complexity of Data Reviewed  Clinical lab tests: reviewed  Tests in the radiology section of CPT®: reviewed  Independent visualization of images, tracings, or specimens: yes    Risk of Complications, Morbidity, and/or Mortality  General comments: Instructions reviewed  Patient Progress  Patient progress: improved      Disposition  Final diagnoses:   Influenza A   Pneumonia     Time reflects when diagnosis was documented in both MDM as applicable and the Disposition within this note     Time User Action Codes Description Comment    11/19/2022  9:22 AM Jessica Bertrand [J10 1] Influenza A     11/19/2022  9:22 AM Jessica Bertrand [J18 9] Pneumonia       ED Disposition     ED Disposition   Discharge    Condition   Stable    Date/Time   Sat Nov 19, 2022  9:21 AM    Comment   Staci Lopez discharge to home/self care                 Follow-up Information     Follow up With Specialties Details Why Contact Info    [facility]    [phone]            Discharge Medication List as of 11/19/2022  9:23 AM      START taking these medications    Details   doxycycline hyclate (VIBRAMYCIN) 100 mg capsule Take 1 capsule (100 mg total) by mouth 2 (two) times a day for 7 days, Starting Sat 11/19/2022, Until Sat 11/26/2022, Normal         CONTINUE these medications which have NOT CHANGED    Details   losartan (COZAAR) 50 mg tablet losartan 50 mg tablet   TAKE 1 TABLET BY MOUTH EVERY DAY, Historical Med      acetaminophen (Tylenol) 325 mg tablet Take 650 mg by mouth every 6 (six) hours as needed for mild pain, Historical Med      cholecalciferol (VITAMIN D3) 1,000 units tablet Take 1,000 Units by mouth daily, Historical Med      ibuprofen (MOTRIN) 200 mg tablet Take 200-800 mg by mouth every 6 (six) hours as needed for mild pain, Historical Med      NON FORMULARY Medical marijuana drops prn, Historical Med             No discharge procedures on file      PDMP Review       Value Time User    PDMP Reviewed  Yes 9/20/2019  1:10 PM Yusuf Ward MD          ED Provider  Electronically Signed by           Victor Hugo Chi PA-C  11/19/22 1007 No

## 2023-03-16 NOTE — OB PROVIDER TRIAGE NOTE - NS_ABDFINDING_OBGYN_ALL_OB
Soft, nontender [Normal] : oriented to person, place and time, the affect was normal, the mood was normal and not anxious

## 2024-03-18 ENCOUNTER — EMERGENCY (EMERGENCY)
Facility: HOSPITAL | Age: 34
LOS: 0 days | Discharge: HOME | End: 2024-03-18
Attending: EMERGENCY MEDICINE
Payer: COMMERCIAL

## 2024-03-18 VITALS
TEMPERATURE: 98 F | SYSTOLIC BLOOD PRESSURE: 113 MMHG | DIASTOLIC BLOOD PRESSURE: 72 MMHG | RESPIRATION RATE: 18 BRPM | HEART RATE: 71 BPM | OXYGEN SATURATION: 100 %

## 2024-03-18 VITALS
SYSTOLIC BLOOD PRESSURE: 119 MMHG | OXYGEN SATURATION: 99 % | TEMPERATURE: 98 F | HEART RATE: 68 BPM | DIASTOLIC BLOOD PRESSURE: 65 MMHG | HEIGHT: 63 IN | RESPIRATION RATE: 16 BRPM | WEIGHT: 130.07 LBS

## 2024-03-18 DIAGNOSIS — O09.299 SUPERVISION OF PREGNANCY WITH OTHER POOR REPRODUCTIVE OR OBSTETRIC HISTORY, UNSPECIFIED TRIMESTER: Chronic | ICD-10-CM

## 2024-03-18 DIAGNOSIS — R10.13 EPIGASTRIC PAIN: ICD-10-CM

## 2024-03-18 DIAGNOSIS — R11.2 NAUSEA WITH VOMITING, UNSPECIFIED: ICD-10-CM

## 2024-03-18 DIAGNOSIS — D68.61 ANTIPHOSPHOLIPID SYNDROME: ICD-10-CM

## 2024-03-18 DIAGNOSIS — Z98.890 OTHER SPECIFIED POSTPROCEDURAL STATES: Chronic | ICD-10-CM

## 2024-03-18 LAB
ALBUMIN SERPL ELPH-MCNC: 4.6 G/DL — SIGNIFICANT CHANGE UP (ref 3.5–5.2)
ALP SERPL-CCNC: 95 U/L — SIGNIFICANT CHANGE UP (ref 30–115)
ALT FLD-CCNC: 57 U/L — HIGH (ref 0–41)
ANION GAP SERPL CALC-SCNC: 12 MMOL/L — SIGNIFICANT CHANGE UP (ref 7–14)
APPEARANCE UR: CLEAR — SIGNIFICANT CHANGE UP
AST SERPL-CCNC: 29 U/L — SIGNIFICANT CHANGE UP (ref 0–41)
BACTERIA # UR AUTO: NEGATIVE /HPF — SIGNIFICANT CHANGE UP
BASOPHILS # BLD AUTO: 0.01 K/UL — SIGNIFICANT CHANGE UP (ref 0–0.2)
BASOPHILS NFR BLD AUTO: 0.1 % — SIGNIFICANT CHANGE UP (ref 0–1)
BILIRUB SERPL-MCNC: 0.4 MG/DL — SIGNIFICANT CHANGE UP (ref 0.2–1.2)
BILIRUB UR-MCNC: NEGATIVE — SIGNIFICANT CHANGE UP
BUN SERPL-MCNC: 13 MG/DL — SIGNIFICANT CHANGE UP (ref 10–20)
CALCIUM SERPL-MCNC: 9.9 MG/DL — SIGNIFICANT CHANGE UP (ref 8.4–10.5)
CAST: 2 /LPF — SIGNIFICANT CHANGE UP (ref 0–4)
CHLORIDE SERPL-SCNC: 103 MMOL/L — SIGNIFICANT CHANGE UP (ref 98–110)
CO2 SERPL-SCNC: 26 MMOL/L — SIGNIFICANT CHANGE UP (ref 17–32)
COLOR SPEC: YELLOW — SIGNIFICANT CHANGE UP
CREAT SERPL-MCNC: 1.1 MG/DL — SIGNIFICANT CHANGE UP (ref 0.7–1.5)
DIFF PNL FLD: ABNORMAL
EGFR: 68 ML/MIN/1.73M2 — SIGNIFICANT CHANGE UP
EOSINOPHIL # BLD AUTO: 0.11 K/UL — SIGNIFICANT CHANGE UP (ref 0–0.7)
EOSINOPHIL NFR BLD AUTO: 1.5 % — SIGNIFICANT CHANGE UP (ref 0–8)
GLUCOSE SERPL-MCNC: 96 MG/DL — SIGNIFICANT CHANGE UP (ref 70–99)
GLUCOSE UR QL: NEGATIVE MG/DL — SIGNIFICANT CHANGE UP
HCG SERPL QL: NEGATIVE — SIGNIFICANT CHANGE UP
HCT VFR BLD CALC: 39 % — SIGNIFICANT CHANGE UP (ref 37–47)
HGB BLD-MCNC: 13 G/DL — SIGNIFICANT CHANGE UP (ref 12–16)
IMM GRANULOCYTES NFR BLD AUTO: 0.3 % — SIGNIFICANT CHANGE UP (ref 0.1–0.3)
KETONES UR-MCNC: NEGATIVE MG/DL — SIGNIFICANT CHANGE UP
LACTATE SERPL-SCNC: 1.6 MMOL/L — SIGNIFICANT CHANGE UP (ref 0.7–2)
LEUKOCYTE ESTERASE UR-ACNC: ABNORMAL
LIDOCAIN IGE QN: 12 U/L — SIGNIFICANT CHANGE UP (ref 7–60)
LYMPHOCYTES # BLD AUTO: 2.7 K/UL — SIGNIFICANT CHANGE UP (ref 1.2–3.4)
LYMPHOCYTES # BLD AUTO: 36 % — SIGNIFICANT CHANGE UP (ref 20.5–51.1)
MAGNESIUM SERPL-MCNC: 2 MG/DL — SIGNIFICANT CHANGE UP (ref 1.8–2.4)
MCHC RBC-ENTMCNC: 29 PG — SIGNIFICANT CHANGE UP (ref 27–31)
MCHC RBC-ENTMCNC: 33.3 G/DL — SIGNIFICANT CHANGE UP (ref 32–37)
MCV RBC AUTO: 86.9 FL — SIGNIFICANT CHANGE UP (ref 81–99)
MONOCYTES # BLD AUTO: 0.61 K/UL — HIGH (ref 0.1–0.6)
MONOCYTES NFR BLD AUTO: 8.1 % — SIGNIFICANT CHANGE UP (ref 1.7–9.3)
NEUTROPHILS # BLD AUTO: 4.05 K/UL — SIGNIFICANT CHANGE UP (ref 1.4–6.5)
NEUTROPHILS NFR BLD AUTO: 54 % — SIGNIFICANT CHANGE UP (ref 42.2–75.2)
NITRITE UR-MCNC: NEGATIVE — SIGNIFICANT CHANGE UP
NRBC # BLD: 0 /100 WBCS — SIGNIFICANT CHANGE UP (ref 0–0)
PH UR: 5.5 — SIGNIFICANT CHANGE UP (ref 5–8)
PLATELET # BLD AUTO: 190 K/UL — SIGNIFICANT CHANGE UP (ref 130–400)
PMV BLD: 11.3 FL — HIGH (ref 7.4–10.4)
POTASSIUM SERPL-MCNC: 3.9 MMOL/L — SIGNIFICANT CHANGE UP (ref 3.5–5)
POTASSIUM SERPL-SCNC: 3.9 MMOL/L — SIGNIFICANT CHANGE UP (ref 3.5–5)
PROT SERPL-MCNC: 7.4 G/DL — SIGNIFICANT CHANGE UP (ref 6–8)
PROT UR-MCNC: NEGATIVE MG/DL — SIGNIFICANT CHANGE UP
RBC # BLD: 4.49 M/UL — SIGNIFICANT CHANGE UP (ref 4.2–5.4)
RBC # FLD: 13 % — SIGNIFICANT CHANGE UP (ref 11.5–14.5)
RBC CASTS # UR COMP ASSIST: 2 /HPF — SIGNIFICANT CHANGE UP (ref 0–4)
SODIUM SERPL-SCNC: 141 MMOL/L — SIGNIFICANT CHANGE UP (ref 135–146)
SP GR SPEC: 1.01 — SIGNIFICANT CHANGE UP (ref 1–1.03)
SQUAMOUS # UR AUTO: 1 /HPF — SIGNIFICANT CHANGE UP (ref 0–5)
UROBILINOGEN FLD QL: 0.2 MG/DL — SIGNIFICANT CHANGE UP (ref 0.2–1)
WBC # BLD: 7.5 K/UL — SIGNIFICANT CHANGE UP (ref 4.8–10.8)
WBC # FLD AUTO: 7.5 K/UL — SIGNIFICANT CHANGE UP (ref 4.8–10.8)
WBC UR QL: 30 /HPF — HIGH (ref 0–5)

## 2024-03-18 PROCEDURE — 96375 TX/PRO/DX INJ NEW DRUG ADDON: CPT

## 2024-03-18 PROCEDURE — 81001 URINALYSIS AUTO W/SCOPE: CPT

## 2024-03-18 PROCEDURE — 80053 COMPREHEN METABOLIC PANEL: CPT

## 2024-03-18 PROCEDURE — 99285 EMERGENCY DEPT VISIT HI MDM: CPT

## 2024-03-18 PROCEDURE — 83735 ASSAY OF MAGNESIUM: CPT

## 2024-03-18 PROCEDURE — 85025 COMPLETE CBC W/AUTO DIFF WBC: CPT

## 2024-03-18 PROCEDURE — 74177 CT ABD & PELVIS W/CONTRAST: CPT | Mod: 26,MC

## 2024-03-18 PROCEDURE — 74177 CT ABD & PELVIS W/CONTRAST: CPT | Mod: MC

## 2024-03-18 PROCEDURE — 36415 COLL VENOUS BLD VENIPUNCTURE: CPT

## 2024-03-18 PROCEDURE — 99284 EMERGENCY DEPT VISIT MOD MDM: CPT | Mod: 25

## 2024-03-18 PROCEDURE — 96374 THER/PROPH/DIAG INJ IV PUSH: CPT | Mod: XU

## 2024-03-18 PROCEDURE — 83605 ASSAY OF LACTIC ACID: CPT

## 2024-03-18 PROCEDURE — 87086 URINE CULTURE/COLONY COUNT: CPT

## 2024-03-18 PROCEDURE — 83690 ASSAY OF LIPASE: CPT

## 2024-03-18 PROCEDURE — 84703 CHORIONIC GONADOTROPIN ASSAY: CPT

## 2024-03-18 RX ORDER — CEFPODOXIME PROXETIL 100 MG
1 TABLET ORAL
Qty: 14 | Refills: 0
Start: 2024-03-18 | End: 2024-03-24

## 2024-03-18 RX ORDER — FAMOTIDINE 10 MG/ML
20 INJECTION INTRAVENOUS ONCE
Refills: 0 | Status: COMPLETED | OUTPATIENT
Start: 2024-03-18 | End: 2024-03-18

## 2024-03-18 RX ORDER — ONDANSETRON 8 MG/1
1 TABLET, FILM COATED ORAL
Qty: 5 | Refills: 0
Start: 2024-03-18 | End: 2024-03-19

## 2024-03-18 RX ORDER — SODIUM CHLORIDE 9 MG/ML
1000 INJECTION, SOLUTION INTRAVENOUS ONCE
Refills: 0 | Status: COMPLETED | OUTPATIENT
Start: 2024-03-18 | End: 2024-03-18

## 2024-03-18 RX ORDER — ONDANSETRON 8 MG/1
4 TABLET, FILM COATED ORAL ONCE
Refills: 0 | Status: COMPLETED | OUTPATIENT
Start: 2024-03-18 | End: 2024-03-18

## 2024-03-18 RX ADMIN — SODIUM CHLORIDE 1000 MILLILITER(S): 9 INJECTION, SOLUTION INTRAVENOUS at 15:01

## 2024-03-18 RX ADMIN — ONDANSETRON 4 MILLIGRAM(S): 8 TABLET, FILM COATED ORAL at 15:01

## 2024-03-18 RX ADMIN — Medication 30 MILLILITER(S): at 15:01

## 2024-03-18 RX ADMIN — FAMOTIDINE 20 MILLIGRAM(S): 10 INJECTION INTRAVENOUS at 15:01

## 2024-03-18 NOTE — ED PROVIDER NOTE - NSFOLLOWUPINSTRUCTIONS_ED_ALL_ED_FT
Our Emergency Department Referral Coordinators will be reaching out to you in the next 24-48 hours from 9:00am to 5:00pm with a follow up appointment. Please expect a phone call from the hospital in that time frame. If you do not receive a call or if you have any questions or concerns, you can reach them at   [phone]     Abdominal Pain    Many things can cause abdominal pain. Usually, abdominal pain is not caused by a disease and will improve without treatment. Your health care provider will do a physical exam and possibly order blood tests and imaging to help determine the seriousness of your pain. However, in many cases, no cause may be found and you may need further testing as an outpatient. Monitor your abdominal pain for any changes.     SEEK IMMEDIATE MEDICAL CARE IF YOU HAVE THE FOLLOWING SYMPTOMS: worsening abdominal pain, vomiting, diarrhea, inability to have bowel movements or pass gas, black or bloody stool, fever accompanying chest pain or back pain, or dizziness/lightheadedness. Our Emergency Department Referral Coordinators will be reaching out to you in the next 24-48 hours from 9:00am to 5:00pm with a follow up appointment. Please expect a phone call from the hospital in that time frame. If you do not receive a call or if you have any questions or concerns, you can reach them at   [phone]     Abdominal Pain    Many things can cause abdominal pain. Usually, abdominal pain is not caused by a disease and will improve without treatment. Your health care provider will do a physical exam and possibly order blood tests and imaging to help determine the seriousness of your pain. However, in many cases, no cause may be found and you may need further testing as an outpatient. Monitor your abdominal pain for any changes.     SEEK IMMEDIATE MEDICAL CARE IF YOU HAVE THE FOLLOWING SYMPTOMS: worsening abdominal pain, vomiting, diarrhea, inability to have bowel movements or pass gas, black or bloody stool, fever accompanying chest pain or back pain, or dizziness/lightheadedness.    Urinary Tract Infection    A urinary tract infection (UTI) is an infection of any part of the urinary tract, which includes the kidneys, ureters, bladder, and urethra. Risk factors include ignoring your need to urinate, wiping back to front if female, being an uncircumcised male, and having diabetes or a weak immune system. Symptoms include frequent urination, pain or burning with urination, foul smelling urine, cloudy urine, pain in the lower abdomen, blood in the urine, and fever. If you were prescribed an antibiotic medicine, take it as told by your health care provider. Do not stop taking the antibiotic even if you start to feel better.    SEEK IMMEDIATE MEDICAL CARE IF YOU HAVE THE FOLLOWING SYMPTOMS: severe back or abdominal pain, inability to keep fluids or medicine down, dizziness/lightheadedness, or a change in mental status.

## 2024-03-18 NOTE — ED PROVIDER NOTE - PHYSICAL EXAMINATION
VITAL SIGNS: I have reviewed nursing notes and confirm.  CONSTITUTIONAL: Patient is in no acute distress.  SKIN: Skin exam is warm and dry, no acute rash.  HEAD: Normocephalic; atraumatic.  EYES: PERRL, EOM intact; conjunctiva and sclera clear.  ENT: No nasal discharge; airway clear.   NECK: Supple; non tender.  CARD: S1, S2 normal; no murmurs, gallops, or rubs. Regular rate and rhythm.  RESP: Clear to auscultation bilaterally. No wheezes, rales or rhonchi.  ABD: Normal bowel sounds; soft; non-distended; Tenderness to epigastric region.  Tenderness to left mid abdominal region.  No rebound tenderness or guarding.  EXT: Normal ROM. No edema.  LYMPH: No acute cervical adenopathy.  NEURO: Alert, oriented. Grossly unremarkable. No focal deficits.  PSYCH: Cooperative, appropriate.

## 2024-03-18 NOTE — ED PROVIDER NOTE - OBJECTIVE STATEMENT
33-year-old female with past medical history of antiphospholipid syndrome presenting for evaluation of epigastric abdominal pain associated with nausea and vomiting since Monday.  Symptoms started after taking Macrobid and Pyridium.  No diarrhea.  No fevers or chills.  No urinary symptoms.

## 2024-03-18 NOTE — ED PROVIDER NOTE - CLINICAL SUMMARY MEDICAL DECISION MAKING FREE TEXT BOX
Patient presented with epigastric abdominal pain and vomiting x several days as documented. (+) tender on exam but otherwise afebrile, HD stable, well appearing. Obtained labs which were grossly unremarkable including no significant leukocytosis, anemia, signs of dehydration/ABI, transaminitis or significant electrolyte abnormalities. UA grossly negative for infection. CT abd/pelvis negative for emergent processes. Patient felt much better after tx in ED, and serial abdominal exams benign. Able to tolerate PO. Given the above, will discharge home with outpatient follow up. Patient agreeable with plan. Agrees to return to ED for any new or worsening symptoms.

## 2024-03-18 NOTE — ED PROVIDER NOTE - PATIENT PORTAL LINK FT
You can access the FollowMyHealth Patient Portal offered by [facility] by registering at the following website: [email or web address]. By joining [facility]’s FollowMyHealth portal, you will also be able to view your health information using other applications (apps) compatible with our system.

## 2024-03-18 NOTE — ED ADULT NURSE NOTE - NSFALLUNIVINTERV_ED_ALL_ED
Bed/Stretcher in lowest position, wheels locked, appropriate side rails in place/Call bell, personal items and telephone in reach/Instruct patient to call for assistance before getting out of bed/chair/stretcher/Non-slip footwear applied when patient is off stretcher/[place] to call system/Physically safe environment - no spills, clutter or unnecessary equipment/Purposeful proactive rounding/Room/bathroom lighting operational, light cord in reach

## 2024-03-19 LAB
CULTURE RESULTS: SIGNIFICANT CHANGE UP
SPECIMEN SOURCE: SIGNIFICANT CHANGE UP

## 2024-04-08 NOTE — OB RN TRIAGE NOTE - MENTAL HEALTH CONDITIONS/SYMPTOMS, PROFILE
You must understand that you've received an Urgent Care treatment only and that you may be released before all your medical problems are known or treated. You, the patient, will arrange for follow up care as instructed.  Follow up with your PCP or specialty clinic as directed in the next 1-2 weeks if not improved or as needed.  You can call [phone] to schedule an appointment with the appropriate provider.  If your condition worsens we recommend that you receive another evaluation at the emergency room immediately or contact your primary medical clinics after hours call service to discuss your concerns.  Please return here or go to the Emergency Department for any concerns or worsening of condition.  Please if you smoke please consider quitting. [facility] Smoke cessation hotline number is [phone], available at this number is free counseling and medications to live a healthier life!       If you were prescribed a narcotic or controlled medication, do not drive or operate heavy equipment or machinery while taking these medications.    If you were not prescribed an antibiotic and your not better please return for a recheck. Antibiotic therapy is not always indicated initially.   Please attempt over the counter medications, give it time and try Echinacea, Zinc and Vitamin C to fight common colds and virus.     
none

## 2024-07-17 NOTE — ED ADULT TRIAGE NOTE - HEIGHT IN INCHES

## 2024-08-09 ENCOUNTER — APPOINTMENT (OUTPATIENT)
Dept: GASTROENTEROLOGY | Facility: CLINIC | Age: 34
End: 2024-08-09

## 2024-08-09 PROBLEM — F12.90 OCCASIONAL USE OF MARIJUANA: Status: ACTIVE | Noted: 2024-08-09

## 2024-08-09 PROBLEM — Z87.19 HISTORY OF HEMORRHOIDS: Status: RESOLVED | Noted: 2024-08-09 | Resolved: 2024-08-09

## 2024-08-09 PROBLEM — Z12.11 ENCOUNTER FOR SCREENING COLONOSCOPY: Status: ACTIVE | Noted: 2024-08-09 | Resolved: 2024-08-23

## 2024-08-09 PROBLEM — R10.13 PAIN IN EPIGASTRIC REGION ON PALPATION: Status: ACTIVE | Noted: 2024-08-09

## 2024-08-09 PROBLEM — K59.00 CONSTIPATION: Status: ACTIVE | Noted: 2024-08-09

## 2024-08-09 PROBLEM — R79.89 ELEVATED LFTS: Status: ACTIVE | Noted: 2024-08-09

## 2024-08-09 PROBLEM — R19.7 DIARRHEA: Status: ACTIVE | Noted: 2024-08-09

## 2024-08-09 PROBLEM — Z78.9 CAFFEINE USE: Status: ACTIVE | Noted: 2024-08-09

## 2024-08-09 PROBLEM — R12 HEARTBURN: Status: ACTIVE | Noted: 2024-08-09

## 2024-08-09 PROBLEM — R10.9 ABDOMINAL PAIN: Status: ACTIVE | Noted: 2024-08-09

## 2024-08-09 PROCEDURE — 99204 OFFICE O/P NEW MOD 45 MIN: CPT

## 2024-08-09 NOTE — HISTORY OF PRESENT ILLNESS
[FreeTextEntry1] : Patient is a 33yo female, has hx of Antiphospholipid Syndrome (on ASA 81mg), blood clots, hemorrhoids.  States she has abdominal pain that resolves after BMs, nausea, bloating, belching, constipation with alternating diarrhea, heart burn. States she is eating less due to nausea. Patient takes Tums every night, with symptom relief. States she sometimes sees blood on tissue when wiping but denies blood in stool.   Patient denies dysphagia, unintentional weight loss,  Patient has never had endoscopy, colonoscopy.  Family hx of colon cancer  in early 40s (maternal grandma), precancerous polyps (mother)

## 2024-08-09 NOTE — PHYSICAL EXAM
[Normal] : heart rate was normal and rhythm regular, normal S1 and S2, no murmurs [Bowel Sounds] : normal bowel sounds [No Masses] : no abdominal mass palpated [Abdomen Soft] : soft [Epigastric] : epigastric [Abnormal Walk] : normal gait [Oriented To Time, Place, And Person] : oriented to person, place, and time

## 2024-08-09 NOTE — END OF VISIT
[] : Fellow [FreeTextEntry3] :  Pt seen/examined, agree with above findings and plan as outlined by Ermelinda Mason NP.

## 2024-08-09 NOTE — ASSESSMENT
[FreeTextEntry1] : Patient is a 34yoF, has hx of Antiphospholipid Syndrome (on ASA 81mg), blood clots, hemorrhoids, gallbladder sludge. Presenting today for acid reflux and crc screening  GERD/Epigastric Pain -will schedule EGD -discussed lifestyle modifications -Trial pepcid 20mg qhs/prn  Elevated LFTs -Recheck CMP, CBC, Hepatitis Panel -RUQ US   Constipation -increase water intake -discussed dietary changes and increase fiber intake   CRC Screening (high risk), family hx of colon cancer/polyps -schedule colonoscopy  -risks vs. benefits discussed -educated on prep -will send Dulcolax and golytely   Follow up after procedures

## 2024-11-19 NOTE — OB PROVIDER TRIAGE NOTE - CURRENT PREGNANCY COMPLICATIONS, OB PROFILE
Other
Duration Of Freeze Thaw-Cycle (Seconds): 0
Detail Level: Detailed
Show Applicator Variable?: Yes
Consent: The patient's consent was obtained including but not limited to risks of crusting, scabbing, blistering, scarring, darker or lighter pigmentary change, recurrence, incomplete removal and infection.
Render Post-Care Instructions In Note?: no
Post-Care Instructions: I reviewed with the patient in detail post-care instructions. Patient is to wear sunprotection, and avoid picking at any of the treated lesions. Pt may apply Vaseline to crusted or scabbing areas.

## 2025-01-14 NOTE — ED ADULT NURSE NOTE - CAS DISCH CONDITION
Last Clinic Visit: 8/12/2024       metoprolol succinate ER (TOPROL XL) 200 MG 24 hr tablet: passed medication protocol  - refills sent  - message sent to patient  
Stable